# Patient Record
Sex: FEMALE | URBAN - METROPOLITAN AREA
[De-identification: names, ages, dates, MRNs, and addresses within clinical notes are randomized per-mention and may not be internally consistent; named-entity substitution may affect disease eponyms.]

---

## 2018-04-11 ENCOUNTER — OUTPATIENT (OUTPATIENT)
Dept: OUTPATIENT SERVICES | Facility: HOSPITAL | Age: 53
LOS: 1 days | End: 2018-04-11
Payer: COMMERCIAL

## 2018-04-11 VITALS
RESPIRATION RATE: 16 BRPM | TEMPERATURE: 98 F | SYSTOLIC BLOOD PRESSURE: 132 MMHG | HEART RATE: 94 BPM | HEIGHT: 63 IN | DIASTOLIC BLOOD PRESSURE: 85 MMHG | WEIGHT: 125.66 LBS

## 2018-04-11 DIAGNOSIS — Z29.9 ENCOUNTER FOR PROPHYLACTIC MEASURES, UNSPECIFIED: ICD-10-CM

## 2018-04-11 DIAGNOSIS — J38.1 POLYP OF VOCAL CORD AND LARYNX: ICD-10-CM

## 2018-04-11 DIAGNOSIS — Z98.891 HISTORY OF UTERINE SCAR FROM PREVIOUS SURGERY: Chronic | ICD-10-CM

## 2018-04-11 DIAGNOSIS — Z01.818 ENCOUNTER FOR OTHER PREPROCEDURAL EXAMINATION: ICD-10-CM

## 2018-04-11 LAB
APTT BLD: 29.2 SEC — SIGNIFICANT CHANGE UP (ref 27.5–37.4)
BASOPHILS # BLD AUTO: 0 K/UL — SIGNIFICANT CHANGE UP (ref 0–0.2)
BASOPHILS NFR BLD AUTO: 0.3 % — SIGNIFICANT CHANGE UP (ref 0–2)
EOSINOPHIL # BLD AUTO: 0.2 K/UL — SIGNIFICANT CHANGE UP (ref 0–0.5)
EOSINOPHIL NFR BLD AUTO: 2.6 % — SIGNIFICANT CHANGE UP (ref 0–6)
HCT VFR BLD CALC: 34.1 % — LOW (ref 37–47)
HGB BLD-MCNC: 11.3 G/DL — LOW (ref 12–16)
INR BLD: 0.95 RATIO — SIGNIFICANT CHANGE UP (ref 0.88–1.16)
LYMPHOCYTES # BLD AUTO: 2.5 K/UL — SIGNIFICANT CHANGE UP (ref 1–4.8)
LYMPHOCYTES # BLD AUTO: 34.1 % — SIGNIFICANT CHANGE UP (ref 20–55)
MCHC RBC-ENTMCNC: 30.9 PG — SIGNIFICANT CHANGE UP (ref 27–31)
MCHC RBC-ENTMCNC: 33.1 G/DL — SIGNIFICANT CHANGE UP (ref 32–36)
MCV RBC AUTO: 93.2 FL — SIGNIFICANT CHANGE UP (ref 81–99)
MONOCYTES # BLD AUTO: 0.5 K/UL — SIGNIFICANT CHANGE UP (ref 0–0.8)
MONOCYTES NFR BLD AUTO: 6.9 % — SIGNIFICANT CHANGE UP (ref 3–10)
NEUTROPHILS # BLD AUTO: 4.2 K/UL — SIGNIFICANT CHANGE UP (ref 1.8–8)
NEUTROPHILS NFR BLD AUTO: 56 % — SIGNIFICANT CHANGE UP (ref 37–73)
PLATELET # BLD AUTO: 306 K/UL — SIGNIFICANT CHANGE UP (ref 150–400)
PROTHROM AB SERPL-ACNC: 10.4 SEC — SIGNIFICANT CHANGE UP (ref 9.8–12.7)
RBC # BLD: 3.66 M/UL — LOW (ref 4.4–5.2)
RBC # FLD: 13.1 % — SIGNIFICANT CHANGE UP (ref 11–15.6)
WBC # BLD: 7.4 K/UL — SIGNIFICANT CHANGE UP (ref 4.8–10.8)
WBC # FLD AUTO: 7.4 K/UL — SIGNIFICANT CHANGE UP (ref 4.8–10.8)

## 2018-04-11 PROCEDURE — 85730 THROMBOPLASTIN TIME PARTIAL: CPT

## 2018-04-11 PROCEDURE — 36415 COLL VENOUS BLD VENIPUNCTURE: CPT

## 2018-04-11 PROCEDURE — 85027 COMPLETE CBC AUTOMATED: CPT

## 2018-04-11 PROCEDURE — 93010 ELECTROCARDIOGRAM REPORT: CPT

## 2018-04-11 PROCEDURE — 85610 PROTHROMBIN TIME: CPT

## 2018-04-11 PROCEDURE — 93005 ELECTROCARDIOGRAM TRACING: CPT

## 2018-04-11 PROCEDURE — G0463: CPT

## 2018-04-11 NOTE — H&P PST ADULT - ASSESSMENT
52 year old female present with complaints of voice changes.  Patient state work up reveled vocal cord polyps.  She is schedule for direct suspension laryngoscopy CO2 laser excision vocal cord polyps.    CAPRINI SCORE [CLOT]    AGE RELATED RISK FACTORS                                                       MOBILITY RELATED FACTORS  [ x] Age 41-60 years                                            (1 Point)                  [ ] Bed rest                                                        (1 Point)  [ ] Age: 61-74 years                                           (2 Points)                 [ ] Plaster cast                                                   (2 Points)  [ ] Age= 75 years                                              (3 Points)                 [ ] Bed bound for more than 72 hours                 (2 Points)    DISEASE RELATED RISK FACTORS                                               GENDER SPECIFIC FACTORS  [ ] Edema in the lower extremities                       (1 Point)                  [ ] Pregnancy                                                     (1 Point)  [ ] Varicose veins                                               (1 Point)                  [ ] Post-partum < 6 weeks                                   (1 Point)             [ x] BMI > 25 Kg/m2                                            (1 Point)                  [ ] Hormonal therapy  or oral contraception          (1 Point)                 [ ] Sepsis (in the previous month)                        (1 Point)                  [ ] History of pregnancy complications                 (1 point)  [ ] Pneumonia or serious lung disease                                               [ ] Unexplained or recurrent                     (1 Point)           (in the previous month)                               (1 Point)  [ ] Abnormal pulmonary function test                     (1 Point)                 SURGERY RELATED RISK FACTORS  [ ] Acute myocardial infarction                              (1 Point)                 [ ]  Section                                             (1 Point)  [ ] Congestive heart failure (in the previous month)  (1 Point)               [ ] Minor surgery                                                  (1 Point)   [ ] Inflammatory bowel disease                             (1 Point)                 [ ] Arthroscopic surgery                                        (2 Points)  [ ] Central venous access                                      (2 Points)                [ xGeneral surgery lasting more than 45 minutes   (2 Points)       [ ] Stroke (in the previous month)                          (5 Points)               [ ] Elective arthroplasty                                         (5 Points)                                                                                                                                               HEMATOLOGY RELATED FACTORS                                                 TRAUMA RELATED RISK FACTORS  [ ] Prior episodes of VTE                                     (3 Points)                 [ ] Fracture of the hip, pelvis, or leg                       (5 Points)  [ ] Positive family history for VTE                         (3 Points)                 [ ] Acute spinal cord injury (in the previous month)  (5 Points)  [ ] Prothrombin 76305 A                                     (3 Points)                 [ ] Paralysis  (less than 1 month)                             (5 Points)  [ ] Factor V Leiden                                             (3 Points)                  [ ] Multiple Trauma within 1 month                        (5 Points)  [ ] Lupus anticoagulants                                     (3 Points)                                                           [ ] Anticardiolipin antibodies                               (3 Points)                                                       [ ] High homocysteine in the blood                      (3 Points)                                             [ ] Other congenital or acquired thrombophilia      (3 Points)                                                [ ] Heparin induced thrombocytopenia                  (3 Points)                                          Total Score [    4    ]

## 2018-04-11 NOTE — H&P PST ADULT - HISTORY OF PRESENT ILLNESS
52 year old female present with complaints of voice changes.  Patient state work up reveled vocal cord polyps.  She is schedule for direct suspension laryngoscopy CO2 laser excision vocal cord polyps 52 year old female present with complaints of voice changes.  Patient state work up reveled vocal cord polyps.  She is schedule for direct suspension laryngoscopy CO2 laser excision vocal cord polyps.

## 2018-04-11 NOTE — H&P PST ADULT - NSANTHOSAYNRD_GEN_A_CORE
No. KELSEY screening performed.  STOP BANG Legend: 0-2 = LOW Risk; 3-4 = INTERMEDIATE Risk; 5-8 = HIGH Risk

## 2018-04-20 ENCOUNTER — OUTPATIENT (OUTPATIENT)
Dept: OUTPATIENT SERVICES | Facility: HOSPITAL | Age: 53
LOS: 1 days | End: 2018-04-20
Payer: COMMERCIAL

## 2018-04-20 VITALS
WEIGHT: 128.09 LBS | HEIGHT: 64 IN | DIASTOLIC BLOOD PRESSURE: 65 MMHG | TEMPERATURE: 98 F | RESPIRATION RATE: 16 BRPM | HEART RATE: 82 BPM | OXYGEN SATURATION: 99 % | SYSTOLIC BLOOD PRESSURE: 113 MMHG

## 2018-04-20 VITALS
RESPIRATION RATE: 14 BRPM | HEART RATE: 69 BPM | SYSTOLIC BLOOD PRESSURE: 134 MMHG | DIASTOLIC BLOOD PRESSURE: 76 MMHG | OXYGEN SATURATION: 100 %

## 2018-04-20 DIAGNOSIS — Z98.891 HISTORY OF UTERINE SCAR FROM PREVIOUS SURGERY: Chronic | ICD-10-CM

## 2018-04-20 DIAGNOSIS — Z01.818 ENCOUNTER FOR OTHER PREPROCEDURAL EXAMINATION: ICD-10-CM

## 2018-04-20 DIAGNOSIS — J38.1 POLYP OF VOCAL CORD AND LARYNX: ICD-10-CM

## 2018-04-20 PROCEDURE — 88305 TISSUE EXAM BY PATHOLOGIST: CPT

## 2018-04-20 PROCEDURE — 31536 LARYNGOSCOPY W/BX & OP SCOPE: CPT

## 2018-04-20 PROCEDURE — 88305 TISSUE EXAM BY PATHOLOGIST: CPT | Mod: 26

## 2018-04-20 RX ORDER — ONDANSETRON 8 MG/1
4 TABLET, FILM COATED ORAL ONCE
Qty: 0 | Refills: 0 | Status: DISCONTINUED | OUTPATIENT
Start: 2018-04-20 | End: 2018-04-20

## 2018-04-20 RX ORDER — FENTANYL CITRATE 50 UG/ML
25 INJECTION INTRAVENOUS
Qty: 0 | Refills: 0 | Status: DISCONTINUED | OUTPATIENT
Start: 2018-04-20 | End: 2018-04-20

## 2018-04-20 RX ORDER — SODIUM CHLORIDE 9 MG/ML
3 INJECTION INTRAMUSCULAR; INTRAVENOUS; SUBCUTANEOUS ONCE
Qty: 0 | Refills: 0 | Status: DISCONTINUED | OUTPATIENT
Start: 2018-04-20 | End: 2018-04-20

## 2018-04-20 RX ORDER — SODIUM CHLORIDE 9 MG/ML
1000 INJECTION, SOLUTION INTRAVENOUS
Qty: 0 | Refills: 0 | Status: DISCONTINUED | OUTPATIENT
Start: 2018-04-20 | End: 2018-04-20

## 2018-04-20 RX ADMIN — FENTANYL CITRATE 25 MICROGRAM(S): 50 INJECTION INTRAVENOUS at 12:52

## 2018-04-20 RX ADMIN — FENTANYL CITRATE 25 MICROGRAM(S): 50 INJECTION INTRAVENOUS at 12:57

## 2018-04-20 NOTE — BRIEF OPERATIVE NOTE - PROCEDURE
<<-----Click on this checkbox to enter Procedure Removal of vocal cord polyps  04/20/2018  Direct suspension microlaryngoscopy  with CO2 laser  Active  RGLUIS E

## 2018-04-20 NOTE — BRIEF OPERATIVE NOTE - OPERATION/FINDINGS
huge polyps  left > right  second polyp anterior under vocal cord on the right    Ball valving polyps

## 2018-04-20 NOTE — ASU DISCHARGE PLAN (ADULT/PEDIATRIC). - MEDICATION SUMMARY - MEDICATIONS TO TAKE
I will START or STAY ON the medications listed below when I get home from the hospital:    Percocet 5/325  -- 1 to 2 tab(s) by mouth every 4 hours (5 times/day), As Needed for pain  -- Indication: For Pain med    Cefzil 250 mg oral tablet  -- 1 tab(s) by mouth every 12 hours x 10 days  -- Indication: For antibiotic    omeprazole 40 mg oral delayed release capsule  -- 1 cap(s) by mouth once a day  -- Indication: For antacid med

## 2018-04-20 NOTE — BRIEF OPERATIVE NOTE - POST-OP DX
Vocal cord polyps  04/20/2018    Active  Silvino Cristobal Vocal cord polyps  04/20/2018  Bilateral  Active  Silvino Cristobal

## 2018-04-24 LAB — SURGICAL PATHOLOGY FINAL REPORT - CH: SIGNIFICANT CHANGE UP

## 2019-10-02 PROBLEM — K21.9 GASTRO-ESOPHAGEAL REFLUX DISEASE WITHOUT ESOPHAGITIS: Chronic | Status: ACTIVE | Noted: 2018-04-11

## 2019-10-03 ENCOUNTER — APPOINTMENT (OUTPATIENT)
Dept: SURGICAL ONCOLOGY | Facility: CLINIC | Age: 54
End: 2019-10-03

## 2019-10-03 PROBLEM — Z00.00 ENCOUNTER FOR PREVENTIVE HEALTH EXAMINATION: Status: ACTIVE | Noted: 2019-10-03

## 2021-12-08 NOTE — ASU DISCHARGE PLAN (ADULT/PEDIATRIC). - NOTIFY
[facility] History & Physical    Patient: Bessie Granados   MRN: [id number]  : 1941       Cc: Unwitnessed fall    HPI: This is a 80 year old year old female, hospice patient of Jt Parker DO with past medical history of COPD, dysarthria hypertension severe, non-Hodgkin's lymphoma, dementia who presented to the ED after having an unwitnessed fall at the nursing home this morning. The patient was seen and examined at the bedside.  She currently lives at [facility] under [facility].  History obtained from her son, she apparently fell sometime during the night she is we will chair bound.  She is been in hospice care the past 6 to 9 months for her severe dementia and COPD. She sustained a small left scalp laceration and bruising to her left orbital area, bruising additionally noted to her right knee and hip.  She was evaluated by cardiology. Her scalp laceration was repaired in the ED. I spoke with her son and he is requesting she is transferred back to [facility] under hospice.  [facility] has accepted her back.    Review of Systems  Unable to perform due to dementia     Past Medical History  Past Medical History:   Diagnosis Date   • COPD (chronic obstructive pulmonary disease) (CMS/[facility])    • Coronary artery disease    • Essential (primary) hypertension    • High cholesterol    • Myocardial infarction (CMS/[facility])    • Osteoporosis    • Uncomplicated senile dementia (CMS/[facility])         Surgical History  History reviewed. No pertinent surgical history.     Social History  Social History     Tobacco Use   • Smoking status: Former Smoker   • Smokeless tobacco: Never Used   Substance Use Topics   • Alcohol use: Not Currently   • Drug use: Never       Family History  History reviewed. No pertinent family history.     Allergies  ALLERGIES:  Penicillin g    Medications  Medications Prior to Admission   Medication Sig Dispense Refill   • bisacodyl (DULCOLAX) 10 MG suppository Place 10 mg rectally daily as needed for  Constipation. Indications: Constipation     • Skin Protectants, Misc. (eucerin) cream Apply 1 application topically at bedtime as needed for Dry Skin. Apply to full body     • hyoscyamine (LEVSIN SL) 0.125 MG sublingual tablet Place 0.125 mg under the tongue every 6 hours as needed (secretions).     • senna (SENOKOT) 8.6 MG tablet Take 2 tablets by mouth daily. Indications: Constipation     • omeprazole (PrilOSEC) 20 MG capsule Take 20 mg by mouth daily.     • ipratropium-albuterol (DUONEB) 0.5-2.5 (3) MG/3ML nebulizer solution Take 3 mLs by nebulization every 6 hours as needed. Do not start before September 13, 2020.     • docusate sodium (COLACE) 100 MG capsule Take 100 mg by mouth 2 times daily as needed for Constipation.     • guaifenesin 100 MG/5ML Take 300 mg by mouth every 6 hours as needed (cough).     • umeclidinium bromide (Incruse Ellipta) 62.5 MCG/INH inhaler Inhale 1 puff into the lungs daily.     • latanoprost (XALATAN) 0.005 % ophthalmic solution Place 1 drop into both eyes nightly.     • metoPROLOL succinate (TOPROL-XL) 50 MG 24 hr tablet Take 50 mg by mouth daily. Indications: High Blood Pressure Disorder Hold if SBP is less than 120 or HR less than 60     • acetaminophen (TYLENOL) 325 MG tablet Take 650 mg by mouth every 6 hours as needed for Pain.         Current medications  Current Facility-Administered Medications   Medication Dose Route Frequency Provider Last Rate Last Admin   • lidocaine-EPINEPHrine 1 %-1:[id number] injection 5 mL  5 mL Injection Once Marisela Bell PA-C           Physical Exam  Physical Exam  Vitals and nursing note reviewed.   Constitutional:       General: She is awake.      Appearance: She is cachectic. She is ill-appearing. She is not toxic-appearing or diaphoretic.   Eyes:      Extraocular Movements: Extraocular movements intact.      Pupils: Pupils are equal, round, and reactive to light.   Cardiovascular:      Rate and Rhythm: Normal rate and regular rhythm.      Pulses:  Normal pulses.      Heart sounds: Normal heart sounds, S1 normal and S2 normal. No murmur heard.      Pulmonary:      Effort: Pulmonary effort is normal.      Breath sounds: Normal breath sounds.   Abdominal:      General: Bowel sounds are normal.   Musculoskeletal:      Right lower leg: No edema.      Left lower leg: No edema.      Comments: Bruising noted to her left orbit right knee right hip   Skin:     General: Skin is warm and dry.   Neurological:      Mental Status: Mental status is at baseline.              OBJECTIVE:      VITAL SIGNS:    Vital Last Value 24 Hour Range   Temperature 97.3 °F (36.3 °C) (12/08/21 1413) Temp  Min: 97.3 °F (36.3 °C)  Max: 97.7 °F (36.5 °C)   Pulse 75 (12/08/21 1413) Pulse  Min: 75  Max: 102   Respiratory 16 (12/08/21 1413) Resp  Min: 16  Max: 19   Non-Invasive  Blood Pressure 128/72 (12/08/21 1413) BP  Min: 128/72  Max: 176/72   Pulse Oximetry 95 % (12/08/21 1413) SpO2  Min: 93 %  Max: 97 %     Vital Today Admitted   Weight 53.7 kg (118 lb 6.2 oz) (12/08/21 0609) Weight: 53.7 kg (118 lb 6.2 oz) (12/08/21 0609)   Height N/A Height: 5' (152.4 cm) (12/08/21 0609)   Body Mass Index N/A BMI (Calculated): 23.12 (12/08/21 0609)     LABS:   Recent Results (from the past 24 hour(s))   Comprehensive Metabolic Panel    Collection Time: 12/08/21  6:15 AM   Result Value Ref Range    Fasting Status      Sodium 139 135 - 145 mmol/L    Potassium 3.9 3.4 - 5.1 mmol/L    Chloride 109 (H) 98 - 107 mmol/L    Carbon Dioxide 27 21 - 32 mmol/L    Anion Gap 7 (L) 10 - 20 mmol/L    Glucose 79 70 - 99 mg/dL    BUN 27 (H) 6 - 20 mg/dL    Creatinine 0.97 (H) 0.51 - 0.95 mg/dL    Glomerular Filtration Rate 55 (L) >=60    BUN/ Creatinine Ratio 28 (H) 7 - 25    Calcium 9.7 8.4 - 10.2 mg/dL    Bilirubin, Total 0.3 0.2 - 1.0 mg/dL    GOT/AST 20 <=37 Units/L    GPT/ALT 17 <64 Units/L    Alkaline Phosphatase 74 45 - 117 Units/L    Albumin 3.4 (L) 3.6 - 5.1 g/dL    Protein, Total 7.6 6.4 - 8.2 g/dL    Globulin 4.2  (H) 2.0 - 4.0 g/dL    A/G Ratio 0.8 (L) 1.0 - 2.4   TROPONIN I, HIGH SENSITIVITY    Collection Time: 12/08/21  6:15 AM   Result Value Ref Range    Troponin I, High Sensitivity 49 <52 ng/L   Creatine Kinase    Collection Time: 12/08/21  6:15 AM   Result Value Ref Range     26 - 192 Units/L   CBC with Automated Differential (performable only)    Collection Time: 12/08/21  6:15 AM   Result Value Ref Range    WBC 10.4 4.2 - 11.0 K/mcL    RBC 4.41 4.00 - 5.20 mil/mcL    HGB 13.7 12.0 - 15.5 g/dL    HCT 42.9 36.0 - 46.5 %    MCV 97.3 78.0 - 100.0 fl    MCH 31.1 26.0 - 34.0 pg    MCHC 31.9 (L) 32.0 - 36.5 g/dL    RDW-CV 13.6 11.0 - 15.0 %    RDW-SD 49.3 39.0 - 50.0 fL     140 - 450 K/mcL    NRBC 0 <=0 /100 WBC    Neutrophil, Percent 80 %    Lymphocytes, Percent 10 %    Mono, Percent 7 %    Eosinophils, Percent 1 %    Basophils, Percent 1 %    Immature Granulocytes 1 %    Absolute Neutrophils 8.5 (H) 1.8 - 7.7 K/mcL    Absolute Lymphocytes 1.0 1.0 - 4.0 K/mcL    Absolute Monocytes 0.8 0.3 - 0.9 K/mcL    Absolute Eosinophils  0.1 0.0 - 0.5 K/mcL    Absolute Basophils 0.1 0.0 - 0.3 K/mcL    Absolute Immmature Granulocytes 0.1 0.0 - 0.2 K/mcL   Electrocardiogram 12-Lead    Collection Time: 12/08/21  7:01 AM   Result Value Ref Range    Ventricular Rate EKG/Min (BPM) 98     Atrial Rate (BPM) 98     IA-Interval (MSEC) 162     QRS-Interval (MSEC) 60     QT-Interval (MSEC) 338     QTc 431     P Axis (Degrees) 89     R Axis (Degrees) -73     T Axis (Degrees) 57     REPORT TEXT       Normal sinus rhythm  Left axis deviation  Pulmonary disease pattern  Inferior infarct  (cited on or before  01-SEP-2020)  Abnormal ECG  When compared with ECG of  01-SEP-2020 11:41,  QRS axis  shifted left  T wave amplitude has decreased in  Lateral leads     TROPONIN I, HIGH SENSITIVITY    Collection Time: 12/08/21  8:59 AM   Result Value Ref Range    Troponin I, High Sensitivity 80 (HH) <52 ng/L   Rapid SARS-CoV-2 by PCR    Collection  Time: 12/08/21 10:29 AM    Specimen: Nasal, Mid-turbinate; Swab   Result Value Ref Range    Rapid SARS-COV-2 by PCR Not Detected Not Detected / Detected / Presumptive Positive / Inhibitors present    Isolation Guidelines      Procedural Comment          IMAGING:  LAST CT:  === 12/08/21 ===    CT CERVICAL SPINE WO CONTRAST    - Narrative -  EXAM: CT CERVICAL SPINE WO CONTRAST    CLINICAL INDICATION: Neck pain after fall.    COMPARISON: 09/01/2020.    TECHNIQUE: The study was acquired in a helical fashion with multiplanar  thin-section reconstructions.    A combination of at least one of these dose reduction techniques were used.  Automated exposure control for dose reduction, adjustment of the mA and/or  kV according to patient size, or use of iterative reconstruction technique  for dose reduction.    FINDINGS:  CT of the cervical spine demonstrates no evidence of fracture, disc  disruption or facet dislocation. The prevertebral soft tissues appear  unremarkable. There is levoscoliosis of the cervical spine.  Reversal the  normal cervical lordosis centered at C5-C6.  Moderate multilevel  degenerative changes with loss of disc space height, disc osteophyte  complexes and facet arthropathy.  Degenerative changes most pronounced at  C3-C4 and C6-C7.  There is associated central canal and neural foraminal  narrowing at these levels.  Degenerative changes bilateral  temporomandibular joints.  Advanced emphysematous changes are seen at the  lung apices.    - Impression -  Multilevel degenerative changes without evidence of acute cervical spine  fracture or subluxation.    Electronically Signed by: SELENA MATTHEW M.D.  Signed on: 12/8/2021 8:35 AM    ___________________________________________________________________________    CT HEAD WO CONTRAST    - Narrative -  EXAM: CT HEAD WO CONTRAST    CLINICAL INDICATION: Headache after fall.  Scalp hematoma.    COMPARISON: 09/01/2020.    TECHNIQUE: Multiple axial images of the head  were obtained from the base of  the skull to the vertex using standard protocol.    A combination of at least one of these dose reduction techniques were used.  Automated exposure control for dose reduction, adjustment of the mA and/or  kV according to patient size, or use of iterative reconstruction technique  for dose reduction.    FINDINGS:  There is no evidence of mass, parenchymal hemorrhage or extra-axial fluid  collection.  Moderate cerebral volume loss.  No evidence of hydrocephalus.  No evidence of edema or midline shift.  The gray-white matter  differentiation is preserved.  Mild, nonspecific, patchy hypodensities in  the periventricular and subcortical white matter likely microvascular  ischemic changes, age indeterminant.  Focal hypodensity in the left  thalamus suggesting prior lacunar infarction.    The craniocervical junction is unremarkable. There is no evidence of  depressed skull fracture or aggressive bony lesion. The orbital structures  appear symmetric and unremarkable. The paranasal sinuses and mastoid air  cells are clear. Large frontal scalp hematoma.    - Impression -  No evidence of acute intracranial process.  No significant interval change.    Electronically Signed by: SELENA MATTHEW M.D.  Signed on: 12/8/2021 8:30 AM    LAST X-RAY:  === 12/08/21 ===    XR PELVIS 1 VIEW    - Narrative -  EXAM: XR PELVIS 1 VIEW    CLINICAL INDICATION: Pelvic pain after unwitnessed fall.    COMPARISON: 07/11/2019    FINDINGS:  Frontal view of the pelvis was obtained.    Pelvic ring is intact.  SI joints are symmetric.  Sacral arches are  preserved.  Degenerative changes of the lumbar spine.    Femoral heads are well-formed and deep seated in the acetabula.  Evaluation  of the right femoral neck is slightly limited due to patient rotation.  If  concern for right hip fracture dedicated right hip views could be obtained.  Grossly no evidence of acute fracture or dislocation.  Degenerative  changes of bilateral  hips.  Vascular calcifications.  Stable oval density  projecting over the left lesser trochanter.    - Impression -  Grossly no evidence of acute fracture or dislocation.    Electronically Signed by: SELENA MATTHEW M.D.  Signed on: 12/8/2021 7:13 AM    ___________________________________________________________________________    XR CHEST PA OR AP 1 VIEW    - Narrative -  EXAM: XR CHEST PA OR AP 1 VIEW    CLINICAL INDICATION: Chest pain.  Fall.    COMPARISON: 09/01/2020    FINDINGS:  Frontal view of the chest was obtained.    Hyperinflation of the lungs with flattening of the diaphragms and  coarsening of interstitial lung markings suggesting COPD changes.  No  evidence of focal consolidation, pleural effusion or pneumothorax.    The cardiomediastinal silhouette is normal in size. Pulmonary vascularity  is within normal limits.  Calcified right hilar lymph nodes and calcified  granuloma right lower lobe.  Healed right lower rib fractures.  Dextroscoliosis with degenerative changes of the thoracic spine.    - Impression -  Advanced COPD changes without evidence of focal consolidation, pleural  effusion or pneumothorax.    Electronically Signed by: SELENA MATTHEW M.D.  Signed on: 12/8/2021 7:09 AM      === 09/01/20 ===      ASSESSMENT/PLAN:     Unwitnessed fall s/p scalp laceration  Wound repair in ED  At baseline  CT head, cervical spine completed noted as above   Evaluated by Cardiology  Patient is under hospice care ([facility])  Son wishes her transferred back there today  Patient is full DNR      PCP: Jt Parker,     Disposition:  Tx back to [facility] with Hospice ([facility])    Discussed with attending physician Dr Maximo Parker  All patient studies and labs have been reviewed.  All patient's questions and concerns were addressed.  D/w nurse  A face to face visit took place today    [facility]  [place]  [phone]  Call for questions.   Persistent Nausea and Vomiting/Fever greater than 101

## 2022-06-29 ENCOUNTER — APPOINTMENT (OUTPATIENT)
Dept: ORTHOPEDIC SURGERY | Facility: CLINIC | Age: 57
End: 2022-06-29

## 2022-07-20 ENCOUNTER — APPOINTMENT (OUTPATIENT)
Dept: ORTHOPEDIC SURGERY | Facility: CLINIC | Age: 57
End: 2022-07-20

## 2022-07-20 VITALS
HEIGHT: 63.5 IN | SYSTOLIC BLOOD PRESSURE: 106 MMHG | DIASTOLIC BLOOD PRESSURE: 64 MMHG | WEIGHT: 107 LBS | BODY MASS INDEX: 18.72 KG/M2 | HEART RATE: 99 BPM

## 2022-07-20 DIAGNOSIS — M21.162 VARUS DEFORMITY, NOT ELSEWHERE CLASSIFIED, LEFT KNEE: ICD-10-CM

## 2022-07-20 DIAGNOSIS — M17.12 UNILATERAL PRIMARY OSTEOARTHRITIS, LEFT KNEE: ICD-10-CM

## 2022-07-20 PROCEDURE — 99204 OFFICE O/P NEW MOD 45 MIN: CPT

## 2022-07-20 PROCEDURE — 73562 X-RAY EXAM OF KNEE 3: CPT | Mod: LT

## 2022-07-20 NOTE — DISCUSSION/SUMMARY
[Medication Risks Reviewed] : Medication risks reviewed [Surgical risks reviewed] : Surgical risks reviewed [de-identified] : 58 y/o F with bone on bone medial compartmental osteoarthritis with significant medial tibial bone loss of the left knee. We discussed the nature of the condition and the treatment options. Based on imaging, she is a candidate for a left TKA. She is having worsening pain and is failing conservative therapies. She would like to pursue surgery and she started the scheduling process today. She has left LE edema and encourage her to try to control her swelling before surgery as it increases her risk for infection. We gave her Celebrex which she should take only as needed. All questions were answered. \par \par The patient is a 57 year individual with end stage arthritis of their left knee joint. Based upon the patient's continued symptoms and failure to respond to conservative treatment I have recommended a left total knee arthroplasty for this patient. A long discussion took place with the patient describing what a total joint replacement is and what the procedure would entail. A total knee arthroplasty model, similar to the implant that was used during the operation, was utilized to demonstrate and to discuss the various bearing surfaces of the implants. The hospitalization and post-operative care and rehabilitation were also discussed. The use of perioperative antibiotics and DVT prophylaxis were discussed. The risk, benefits and alternatives to a surgical intervention were discussed at length with the patient. The patient was also advised of risks related to the medical comorbidities, elevated body mass index (BMI), and smoking where applicable. We discussed how to reduce modifiable risk factors and encouraged smoking cessation were applicable. A lengthy discussion took place to review the most common complications including but not limited to: deep vein thrombosis, pulmonary embolus, heart attack, stroke, infection, wound breakdown, numbness, damage to nerves, tendon, muscles, arteries or other blood vessels, death and other possible complications from anesthesia. The patient was told that we will take steps to minimize these risks by using sterile technique, antibiotics and DVT prophylaxis when appropriate and follow the patient postoperatively in the office setting to monitor progress. The possibility of recurrent pain, no improvement in pain and actual worsening of pain were also discussed with the patient.\par The discharge plan of care focused on the patient going home following surgery. The patient was encouraged to make the necessary arrangements to have someone stay with them when they are discharged home. Following discharge, a home care nurse was to the patient. The home care nurse would open the patient’s home care case and request home physical therapy services. Home physical therapy was to commence following discharge provided it was appropriate and covered by the health insurance benefit plan. \par The benefits of surgery were discussed with the patient including the potential for improving her current clinical condition through operative intervention. Alternatives to surgical intervention including continued conservative management were also discussed in detail. All questions were answered to the satisfaction of the patient. The treatment plan of care, as well as a model of a total knee arthroplasty equivalent to the one that will be used for their total joint replacement, was shared with the patient. The patient agreed to the plan of care as well as the use of implants in their total joint replacement.

## 2022-07-20 NOTE — HISTORY OF PRESENT ILLNESS
[Pain Location] : pain [4] : a minimum pain level of 4/10 [10] : a maximum pain level of 10/10 [Bending] : worsened by bending [Walking] : worsened by walking [Ice] : relieved by ice [Rest] : relieved by rest [de-identified] : 58 y/o F presents with left knee pain. She recently had an aspiration and they removed 72 cc's. She also has had a couple of cortisone injections and states that the last cortisone injection was not too helpful. She went to an urgent care and was told that she had a fracture. She states that she is unable to fully straighten her leg. She reports swelling and was recently on a diuretic. She is on no blood thinners. She is taking no antiinflammatories. 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 She is unsure what she had and the type of surgery she had.

## 2022-07-20 NOTE — PHYSICAL EXAM
[LE] : Sensory: Intact in bilateral lower extremities [ALL] : dorsalis pedis, posterior tibial, femoral, popliteal, and radial 2+ and symmetric bilaterally [Normal] : Alert and in no acute distress [Poor Appearance] : well-appearing [de-identified] : GENERAL APPEARANCE: Well nourished and hydrated, pleasant, alert, and oriented x 3. Appears their stated age. \par HEENT: Normocephalic, extraocular eye motion intact. Nasal septum midline. Oral cavity clear. External auditory canal clear. \par RESPIRATORY: Breath sounds clear and audible in all lobes. No wheezing, No accessory muscle use.\par CARDIOVASCULAR: No apparent abnormalities. No lower leg edema. No varicosities. Pedal pulses are palpable.\par NEUROLOGIC: Sensation is normal, no muscle weakness in the upper or lower extremities.\par DERMATOLOGIC: No apparent skin lesions, moist, warm, no rash.\par SPINE: Cervical spine appears normal and moves freely; thoracic spine appears normal and moves freely; lumbosacral spine appears normal and moves freely, normal, nontender.\par MUSCULOSKELETAL: Hands, wrists, and elbows are normal and move freely, shoulders are normal and move freely.  [de-identified] : Left knee exam shows ROM 5-110 degrees, medial joint line tenderness, effusion, varus deformity\par +1-2 left LE edema [de-identified] : 3V xray of the left knee done in the office today and reviewed by Dr. Suleman Berry demonstrates bone on bone medial compartmental osteoarthritis with significant medial tibial bone loss

## 2022-07-20 NOTE — END OF VISIT
[FreeTextEntry3] : I, Uche Hernández, acted solely as a scribe for Dr. Suleman Berry on this date 07/20/2022.

## 2022-07-22 ENCOUNTER — OUTPATIENT (OUTPATIENT)
Dept: OUTPATIENT SERVICES | Facility: HOSPITAL | Age: 57
LOS: 1 days | End: 2022-07-22
Payer: COMMERCIAL

## 2022-07-22 VITALS
HEIGHT: 64 IN | RESPIRATION RATE: 16 BRPM | OXYGEN SATURATION: 97 % | WEIGHT: 106.26 LBS | SYSTOLIC BLOOD PRESSURE: 96 MMHG | DIASTOLIC BLOOD PRESSURE: 53 MMHG | TEMPERATURE: 97 F | HEART RATE: 82 BPM

## 2022-07-22 DIAGNOSIS — K21.9 GASTRO-ESOPHAGEAL REFLUX DISEASE WITHOUT ESOPHAGITIS: ICD-10-CM

## 2022-07-22 DIAGNOSIS — Z01.818 ENCOUNTER FOR OTHER PREPROCEDURAL EXAMINATION: ICD-10-CM

## 2022-07-22 DIAGNOSIS — Z29.9 ENCOUNTER FOR PROPHYLACTIC MEASURES, UNSPECIFIED: ICD-10-CM

## 2022-07-22 DIAGNOSIS — M17.12 UNILATERAL PRIMARY OSTEOARTHRITIS, LEFT KNEE: ICD-10-CM

## 2022-07-22 DIAGNOSIS — B00.9 HERPESVIRAL INFECTION, UNSPECIFIED: ICD-10-CM

## 2022-07-22 DIAGNOSIS — Z90.3 ACQUIRED ABSENCE OF STOMACH [PART OF]: Chronic | ICD-10-CM

## 2022-07-22 DIAGNOSIS — Z98.891 HISTORY OF UTERINE SCAR FROM PREVIOUS SURGERY: Chronic | ICD-10-CM

## 2022-07-22 DIAGNOSIS — Z22.322 CARRIER OR SUSPECTED CARRIER OF METHICILLIN RESISTANT STAPHYLOCOCCUS AUREUS: ICD-10-CM

## 2022-07-22 DIAGNOSIS — F32.9 MAJOR DEPRESSIVE DISORDER, SINGLE EPISODE, UNSPECIFIED: ICD-10-CM

## 2022-07-22 LAB
A1C WITH ESTIMATED AVERAGE GLUCOSE RESULT: 5.8 % — HIGH (ref 4–5.6)
ANION GAP SERPL CALC-SCNC: 8 MMOL/L — SIGNIFICANT CHANGE UP (ref 5–17)
APTT BLD: 30.4 SEC — SIGNIFICANT CHANGE UP (ref 27.5–35.5)
BLD GP AB SCN SERPL QL: SIGNIFICANT CHANGE UP
BUN SERPL-MCNC: 12.7 MG/DL — SIGNIFICANT CHANGE UP (ref 8–20)
CALCIUM SERPL-MCNC: 9.1 MG/DL — SIGNIFICANT CHANGE UP (ref 8.6–10.2)
CHLORIDE SERPL-SCNC: 99 MMOL/L — SIGNIFICANT CHANGE UP (ref 98–107)
CO2 SERPL-SCNC: 31 MMOL/L — HIGH (ref 22–29)
CREAT SERPL-MCNC: 0.55 MG/DL — SIGNIFICANT CHANGE UP (ref 0.5–1.3)
EGFR: 106 ML/MIN/1.73M2 — SIGNIFICANT CHANGE UP
ESTIMATED AVERAGE GLUCOSE: 120 MG/DL — HIGH (ref 68–114)
GLUCOSE SERPL-MCNC: 100 MG/DL — HIGH (ref 70–99)
HCT VFR BLD CALC: 31.2 % — LOW (ref 34.5–45)
HGB BLD-MCNC: 10.2 G/DL — LOW (ref 11.5–15.5)
INR BLD: 0.95 RATIO — SIGNIFICANT CHANGE UP (ref 0.88–1.16)
MCHC RBC-ENTMCNC: 31.8 PG — SIGNIFICANT CHANGE UP (ref 27–34)
MCHC RBC-ENTMCNC: 32.7 GM/DL — SIGNIFICANT CHANGE UP (ref 32–36)
MCV RBC AUTO: 97.2 FL — SIGNIFICANT CHANGE UP (ref 80–100)
MRSA PCR RESULT.: SIGNIFICANT CHANGE UP
PLATELET # BLD AUTO: 323 K/UL — SIGNIFICANT CHANGE UP (ref 150–400)
POTASSIUM SERPL-MCNC: 3.9 MMOL/L — SIGNIFICANT CHANGE UP (ref 3.5–5.3)
POTASSIUM SERPL-SCNC: 3.9 MMOL/L — SIGNIFICANT CHANGE UP (ref 3.5–5.3)
PROTHROM AB SERPL-ACNC: 11 SEC — SIGNIFICANT CHANGE UP (ref 10.5–13.4)
RBC # BLD: 3.21 M/UL — LOW (ref 3.8–5.2)
RBC # FLD: 12.8 % — SIGNIFICANT CHANGE UP (ref 10.3–14.5)
S AUREUS DNA NOSE QL NAA+PROBE: SIGNIFICANT CHANGE UP
SARS-COV-2 RNA SPEC QL NAA+PROBE: SIGNIFICANT CHANGE UP
SODIUM SERPL-SCNC: 138 MMOL/L — SIGNIFICANT CHANGE UP (ref 135–145)
WBC # BLD: 5.63 K/UL — SIGNIFICANT CHANGE UP (ref 3.8–10.5)
WBC # FLD AUTO: 5.63 K/UL — SIGNIFICANT CHANGE UP (ref 3.8–10.5)

## 2022-07-22 PROCEDURE — 93005 ELECTROCARDIOGRAM TRACING: CPT

## 2022-07-22 PROCEDURE — 93010 ELECTROCARDIOGRAM REPORT: CPT

## 2022-07-22 PROCEDURE — G0463: CPT

## 2022-07-22 RX ORDER — CEFPROZIL 500 MG/1
1 TABLET, FILM COATED ORAL
Qty: 0 | Refills: 0 | DISCHARGE

## 2022-07-22 RX ORDER — MUPIROCIN 20 MG/G
2 OINTMENT TOPICAL
Qty: 1 | Refills: 1 | Status: ACTIVE | COMMUNITY
Start: 2022-07-22 | End: 1900-01-01

## 2022-07-22 RX ORDER — CELECOXIB 200 MG/1
200 CAPSULE ORAL TWICE DAILY
Qty: 14 | Refills: 0 | Status: ACTIVE | COMMUNITY
Start: 2022-07-22 | End: 1900-01-01

## 2022-07-22 NOTE — H&P PST ADULT - PROBLEM SELECTOR PLAN 5
Left Knee Total Joint replacement By Dr. Berry on 7/26/22. Covid vaccine series completed, card in chart,(J&J)  covid test was done today. Medical Clearance pending

## 2022-07-22 NOTE — H&P PST ADULT - NSICDXPASTMEDICALHX_GEN_ALL_CORE_FT
PAST MEDICAL HISTORY:  GERD (gastroesophageal reflux disease)     Herpes simplex     Major depression

## 2022-07-22 NOTE — H&P PST ADULT - CARDIOVASCULAR
negative normal/regular rate and rhythm/S1 S2 present/no gallops/no rub/no murmur/pedal edema/vascular

## 2022-07-22 NOTE — H&P PST ADULT - ASSESSMENT
No abnormal movements 57 year old female with left knee pain for the last 10 months which got progressively got worse over the last few weeks, complains of 10/10 pain, with all activities, she is using a crutches for the last few days to take the pressure of her knees,  she takes Ice and Ibuprofen relies the pain, she had multiple steroid injections and Gel in the past which didn't help much, she is scheduled for a Left Knee Total Joint replacement By Dr. Berry on 7/26/22. Covid vaccine series completed, card in chart,(J&J)  covid test was done today. Medical Clearance pending     medications reviewed, instructions given on what medications to take and what not to take., stop meloxicam 7-10 days prior to surgery, she can take Tylenol for pain. ERP fred give.   57 year old female with left knee pain for the last 10 months which got progressively got worse over the last few weeks, complains of 10/10 pain, with all activities, she is using a crutches for the last few days to take the pressure of her knees,  she takes Ice and Ibuprofen relies the pain, she had multiple steroid injections and Gel in the past which didn't help much, she is scheduled for a Left Knee Total Joint replacement By Dr. Berry on 22. Covid vaccine series completed, card in chart,(J&J)  covid test was done today. Medical Clearance pending     medications reviewed, instructions given on what medications to take and what not to take., stop meloxicam 7-10 days prior to surgery, she can take Tylenol for pain. ERP teachinng give. she can take her Lexapro in the AM of surgery, All other meds can be continued till the night before surgery.     CAPRINI VTE 2.0 SCORE [CLOT updated 2019]    AGE RELATED RISK FACTORS                                                       MOBILITY RELATED FACTORS  [ x] Age 41-60 years                                            (1 Point)                    [ ] Bed rest                                                        (1 Point)  [ ] Age: 61-74 years                                           (2 Points)                  [ ] Plaster cast                                                   (2 Points)  [ ] Age= 75 years                                              (3 Points)                    [ ] Bed bound for more than 72 hours                 (2 Points)    DISEASE RELATED RISK FACTORS                                               GENDER SPECIFIC FACTORS  [ x] Edema in the lower extremities                       (1 Point)              [ ] Pregnancy                                                     (1 Point)  [ ] Varicose veins                                               (1 Point)                     [ ] Post-partum < 6 weeks                                   (1 Point)             [ ] BMI > 25 Kg/m2                                            (1 Point)                     [ ] Hormonal therapy  or oral contraception          (1 Point)                 [ ] Sepsis (in the previous month)                        (1 Point)               [ ] History of pregnancy complications                 (1 point)  [ ] Pneumonia or serious lung disease                                               [ ] Unexplained or recurrent                     (1 Point)           (in the previous month)                               (1 Point)  [ ] Abnormal pulmonary function test                     (1 Point)                 SURGERY RELATED RISK FACTORS  [ ] Acute myocardial infarction                              (1 Point)               [ ]  Section                                             (1 Point)  [ ] Congestive heart failure (in the previous month)  (1 Point)      [ ] Minor surgery                                                  (1 Point)   [ ] Inflammatory bowel disease                             (1 Point)               [ ] Arthroscopic surgery                                        (2 Points)  [ ] Central venous access                                      (2 Points)                [ ] General surgery lasting more than 45 minutes (2 points)  [ ] Malignancy- Present or previous                   (2 Points)                [x ] Elective arthroplasty                                         (5 points)    [ ] Stroke (in the previous month)                          (5 Points)                                                                                                                                                           HEMATOLOGY RELATED FACTORS                                                 TRAUMA RELATED RISK FACTORS  [ ] Prior episodes of VTE                                     (3 Points)                [ ] Fracture of the hip, pelvis, or leg                       (5 Points)  [ ] Positive family history for VTE                         (3 Points)             [ ] Acute spinal cord injury (in the previous month)  (5 Points)  [ ] Prothrombin 88721 A                                     (3 Points)               [ ] Paralysis  (less than 1 month)                             (5 Points)  [ ] Factor V Leiden                                             (3 Points)                  [ ] Multiple Trauma within 1 month                        (5 Points)  [ ] Lupus anticoagulants                                     (3 Points)                                                           [ ] Anticardiolipin antibodies                               (3 Points)                                                       [ ] High homocysteine in the blood                      (3 Points)                                             [ ] Other congenital or acquired thrombophilia      (3 Points)                                                [ ] Heparin induced thrombocytopenia                  (3 Points)                                     Total Score [   7       ]  OPIOID RISK TOOL    BO EACH BOX THAT APPLIES AND ADD TOTALS AT THE END    FAMILY HISTORY OF SUBSTANCE ABUSE                 FEMALE         MALE                                                Alcohol                             [  ]1 pt          [  ]3pts                                               Illegal Drugs                     [  ]2 pts        [  ]3pts                                               Rx Drugs                           [  ]4 pts        [  ]4 pts    PERSONAL HISTORY OF SUBSTANCE ABUSE                                                                                          Alcohol                             [  ]3 pts       [  ]3 pts                                               Illegal Drugs                     [  ]4 pts        [  ]4 pts                                               Rx Drugs                           [  ]5 pts        [  ]5 pts    AGE BETWEEN 16-45 YEARS                                      [  ]1 pt         [  ]1 pt    HISTORY OF PREADOLESCENT   SEXUAL ABUSE                                                             [  ]3 pts        [  ]0pts    PSYCHOLOGICAL DISEASE                     ADD, OCD, Bipolar, Schizophrenia        [  ]2 pts         [  ]2 pts                      Depression                                               [  ]1 pt           [  ]1 pt           SCORING TOTAL   (add numbers and type here)              ( 0)                                     A score of 3 or lower indicated LOW risk for future opioid abuse  A score of 4 to 7 indicated moderate risk for future opioid abuse  A score of 8 or higher indicates a high risk for opioid abuse

## 2022-07-22 NOTE — H&P PST ADULT - HISTORY OF PRESENT ILLNESS
57 year old female with left knee pain for the last 10 months which got progressively got worse over the last few weeks, complains of 10/10 pain, with all activities, she is using a crutches for the last few days to take the pressure of her knees,  she takes Ice and Ibuprofen relies the pain, she had multiple steroid injections and Gel in the past which didn't help much, she is scheduled for a Left Knee Total Joint replacement By Dr. Berry on 7/26/22. Covid vaccine series completed, card in chart,(J&J)  covid test was done today. Medical Clearance pending

## 2022-07-25 ENCOUNTER — TRANSCRIPTION ENCOUNTER (OUTPATIENT)
Age: 57
End: 2022-07-25

## 2022-07-25 NOTE — PATIENT PROFILE ADULT - FALL HARM RISK - HARM RISK INTERVENTIONS
Assistance with ambulation/Assistance OOB with selected safe patient handling equipment/Communicate Risk of Fall with Harm to all staff/Discuss with provider need for PT consult/Monitor gait and stability/Provide patient with walking aids - walker, cane, crutches/Reinforce activity limits and safety measures with patient and family/Sit up slowly, dangle for a short time, stand at bedside before walking/Tailored Fall Risk Interventions/Use of alarms - bed, chair and/or voice tab/Visual Cue: Yellow wristband and red socks/Bed in lowest position, wheels locked, appropriate side rails in place/Call bell, personal items and telephone in reach/Instruct patient to call for assistance before getting out of bed or chair/Non-slip footwear when patient is out of bed/[place] to call system/Physically safe environment - no spills, clutter or unnecessary equipment/Purposeful Proactive Rounding/Room/bathroom lighting operational, light cord in reach

## 2022-07-26 ENCOUNTER — TRANSCRIPTION ENCOUNTER (OUTPATIENT)
Age: 57
End: 2022-07-26

## 2022-07-26 ENCOUNTER — APPOINTMENT (OUTPATIENT)
Dept: ORTHOPEDIC SURGERY | Facility: HOSPITAL | Age: 57
End: 2022-07-26

## 2022-07-26 ENCOUNTER — INPATIENT (INPATIENT)
Facility: HOSPITAL | Age: 57
LOS: 0 days | Discharge: HOME | DRG: 470 | End: 2022-07-27
Attending: ORTHOPAEDIC SURGERY | Admitting: ORTHOPAEDIC SURGERY
Payer: COMMERCIAL

## 2022-07-26 VITALS
OXYGEN SATURATION: 96 % | TEMPERATURE: 98 F | SYSTOLIC BLOOD PRESSURE: 90 MMHG | WEIGHT: 102.96 LBS | DIASTOLIC BLOOD PRESSURE: 84 MMHG | HEART RATE: 76 BPM | HEIGHT: 63 IN | RESPIRATION RATE: 16 BRPM

## 2022-07-26 DIAGNOSIS — M17.12 UNILATERAL PRIMARY OSTEOARTHRITIS, LEFT KNEE: ICD-10-CM

## 2022-07-26 DIAGNOSIS — Z98.891 HISTORY OF UTERINE SCAR FROM PREVIOUS SURGERY: Chronic | ICD-10-CM

## 2022-07-26 DIAGNOSIS — Z90.3 ACQUIRED ABSENCE OF STOMACH [PART OF]: Chronic | ICD-10-CM

## 2022-07-26 LAB
ABO RH CONFIRMATION: SIGNIFICANT CHANGE UP
GLUCOSE BLDC GLUCOMTR-MCNC: 120 MG/DL — HIGH (ref 70–99)
GLUCOSE BLDC GLUCOMTR-MCNC: 78 MG/DL — SIGNIFICANT CHANGE UP (ref 70–99)
GLUCOSE BLDC GLUCOMTR-MCNC: 95 MG/DL — SIGNIFICANT CHANGE UP (ref 70–99)

## 2022-07-26 PROCEDURE — 27447 TOTAL KNEE ARTHROPLASTY: CPT | Mod: AS,LT

## 2022-07-26 PROCEDURE — 99222 1ST HOSP IP/OBS MODERATE 55: CPT

## 2022-07-26 PROCEDURE — 20985 CPTR-ASST DIR MS PX: CPT

## 2022-07-26 PROCEDURE — 73560 X-RAY EXAM OF KNEE 1 OR 2: CPT | Mod: 26,LT

## 2022-07-26 PROCEDURE — 27447 TOTAL KNEE ARTHROPLASTY: CPT | Mod: LT

## 2022-07-26 DEVICE — SCREW PSN FEMALE 2.5X25MM: Type: IMPLANTABLE DEVICE | Status: FUNCTIONAL

## 2022-07-26 DEVICE — SCREW HD HEX 3.5MM: Type: IMPLANTABLE DEVICE | Status: FUNCTIONAL

## 2022-07-26 DEVICE — PATELLA VE 29MM: Type: IMPLANTABLE DEVICE | Status: FUNCTIONAL

## 2022-07-26 DEVICE — STEM EXT PERSONA 14MM PLUS 30M: Type: IMPLANTABLE DEVICE | Status: FUNCTIONAL

## 2022-07-26 DEVICE — TIB PSN NP STM 5 DEG SZ DL: Type: IMPLANTABLE DEVICE | Status: FUNCTIONAL

## 2022-07-26 DEVICE — FEM PERSONA PS CMT CCR STD SZ 5 L: Type: IMPLANTABLE DEVICE | Status: FUNCTIONAL

## 2022-07-26 DEVICE — IMPLANTABLE DEVICE: Type: IMPLANTABLE DEVICE | Status: FUNCTIONAL

## 2022-07-26 DEVICE — PIN HEADLESS TROCHAR 3.2X75MM: Type: IMPLANTABLE DEVICE | Status: FUNCTIONAL

## 2022-07-26 DEVICE — CEMENT BONE PALACOS R W/O GENTAMICIN 1X40: Type: IMPLANTABLE DEVICE | Status: FUNCTIONAL

## 2022-07-26 DEVICE — PIN THREADED HEADED STRL: Type: IMPLANTABLE DEVICE | Status: FUNCTIONAL

## 2022-07-26 RX ORDER — CELECOXIB 200 MG/1
200 CAPSULE ORAL EVERY 12 HOURS
Refills: 0 | Status: DISCONTINUED | OUTPATIENT
Start: 2022-07-28 | End: 2022-07-27

## 2022-07-26 RX ORDER — POLYETHYLENE GLYCOL 3350 17 G/17G
17 POWDER, FOR SOLUTION ORAL AT BEDTIME
Refills: 0 | Status: DISCONTINUED | OUTPATIENT
Start: 2022-07-26 | End: 2022-07-27

## 2022-07-26 RX ORDER — FENTANYL CITRATE 50 UG/ML
25 INJECTION INTRAVENOUS
Refills: 0 | Status: DISCONTINUED | OUTPATIENT
Start: 2022-07-26 | End: 2022-07-26

## 2022-07-26 RX ORDER — PANTOPRAZOLE SODIUM 20 MG/1
40 TABLET, DELAYED RELEASE ORAL
Refills: 0 | Status: DISCONTINUED | OUTPATIENT
Start: 2022-07-26 | End: 2022-07-27

## 2022-07-26 RX ORDER — HYDROMORPHONE HYDROCHLORIDE 2 MG/ML
4 INJECTION INTRAMUSCULAR; INTRAVENOUS; SUBCUTANEOUS
Refills: 0 | Status: DISCONTINUED | OUTPATIENT
Start: 2022-07-26 | End: 2022-07-27

## 2022-07-26 RX ORDER — ACETAMINOPHEN 500 MG
1000 TABLET ORAL ONCE
Refills: 0 | Status: DISCONTINUED | OUTPATIENT
Start: 2022-07-26 | End: 2022-07-26

## 2022-07-26 RX ORDER — SODIUM CHLORIDE 9 MG/ML
3 INJECTION INTRAMUSCULAR; INTRAVENOUS; SUBCUTANEOUS EVERY 8 HOURS
Refills: 0 | Status: DISCONTINUED | OUTPATIENT
Start: 2022-07-26 | End: 2022-07-26

## 2022-07-26 RX ORDER — FENTANYL CITRATE 50 UG/ML
50 INJECTION INTRAVENOUS
Refills: 0 | Status: DISCONTINUED | OUTPATIENT
Start: 2022-07-26 | End: 2022-07-26

## 2022-07-26 RX ORDER — CEFAZOLIN SODIUM 1 G
2000 VIAL (EA) INJECTION ONCE
Refills: 0 | Status: DISCONTINUED | OUTPATIENT
Start: 2022-07-26 | End: 2022-07-26

## 2022-07-26 RX ORDER — KETOROLAC TROMETHAMINE 30 MG/ML
15 SYRINGE (ML) INJECTION EVERY 6 HOURS
Refills: 0 | Status: DISCONTINUED | OUTPATIENT
Start: 2022-07-26 | End: 2022-07-27

## 2022-07-26 RX ORDER — CEFAZOLIN SODIUM 1 G
2000 VIAL (EA) INJECTION
Refills: 0 | Status: COMPLETED | OUTPATIENT
Start: 2022-07-26 | End: 2022-07-27

## 2022-07-26 RX ORDER — ONDANSETRON 8 MG/1
4 TABLET, FILM COATED ORAL EVERY 6 HOURS
Refills: 0 | Status: DISCONTINUED | OUTPATIENT
Start: 2022-07-26 | End: 2022-07-27

## 2022-07-26 RX ORDER — CELECOXIB 200 MG/1
400 CAPSULE ORAL ONCE
Refills: 0 | Status: COMPLETED | OUTPATIENT
Start: 2022-07-26 | End: 2022-07-26

## 2022-07-26 RX ORDER — SODIUM CHLORIDE 9 MG/ML
500 INJECTION INTRAMUSCULAR; INTRAVENOUS; SUBCUTANEOUS ONCE
Refills: 0 | Status: COMPLETED | OUTPATIENT
Start: 2022-07-26 | End: 2022-07-26

## 2022-07-26 RX ORDER — MAGNESIUM HYDROXIDE 400 MG/1
30 TABLET, CHEWABLE ORAL DAILY
Refills: 0 | Status: DISCONTINUED | OUTPATIENT
Start: 2022-07-26 | End: 2022-07-27

## 2022-07-26 RX ORDER — TRANEXAMIC ACID 100 MG/ML
1000 INJECTION, SOLUTION INTRAVENOUS ONCE
Refills: 0 | Status: DISCONTINUED | OUTPATIENT
Start: 2022-07-26 | End: 2022-07-26

## 2022-07-26 RX ORDER — ACETAMINOPHEN 500 MG
975 TABLET ORAL ONCE
Refills: 0 | Status: COMPLETED | OUTPATIENT
Start: 2022-07-26 | End: 2022-07-26

## 2022-07-26 RX ORDER — SODIUM CHLORIDE 9 MG/ML
1000 INJECTION INTRAMUSCULAR; INTRAVENOUS; SUBCUTANEOUS
Refills: 0 | Status: DISCONTINUED | OUTPATIENT
Start: 2022-07-26 | End: 2022-07-27

## 2022-07-26 RX ORDER — ACETAMINOPHEN 500 MG
700 TABLET ORAL ONCE
Refills: 0 | Status: DISCONTINUED | OUTPATIENT
Start: 2022-07-26 | End: 2022-07-27

## 2022-07-26 RX ORDER — OXYCODONE HYDROCHLORIDE 5 MG/1
5 TABLET ORAL
Refills: 0 | Status: DISCONTINUED | OUTPATIENT
Start: 2022-07-26 | End: 2022-07-27

## 2022-07-26 RX ORDER — APREPITANT 80 MG/1
40 CAPSULE ORAL ONCE
Refills: 0 | Status: COMPLETED | OUTPATIENT
Start: 2022-07-26 | End: 2022-07-26

## 2022-07-26 RX ORDER — ACETAMINOPHEN 500 MG
975 TABLET ORAL EVERY 8 HOURS
Refills: 0 | Status: DISCONTINUED | OUTPATIENT
Start: 2022-07-26 | End: 2022-07-27

## 2022-07-26 RX ORDER — ASPIRIN/CALCIUM CARB/MAGNESIUM 324 MG
325 TABLET ORAL
Refills: 0 | Status: DISCONTINUED | OUTPATIENT
Start: 2022-07-26 | End: 2022-07-27

## 2022-07-26 RX ORDER — SENNA PLUS 8.6 MG/1
2 TABLET ORAL AT BEDTIME
Refills: 0 | Status: DISCONTINUED | OUTPATIENT
Start: 2022-07-26 | End: 2022-07-27

## 2022-07-26 RX ORDER — VALACYCLOVIR 500 MG/1
500 TABLET, FILM COATED ORAL DAILY
Refills: 0 | Status: DISCONTINUED | OUTPATIENT
Start: 2022-07-26 | End: 2022-07-27

## 2022-07-26 RX ORDER — OXYCODONE HYDROCHLORIDE 5 MG/1
10 TABLET ORAL
Refills: 0 | Status: DISCONTINUED | OUTPATIENT
Start: 2022-07-26 | End: 2022-07-27

## 2022-07-26 RX ORDER — ESCITALOPRAM OXALATE 10 MG/1
10 TABLET, FILM COATED ORAL DAILY
Refills: 0 | Status: DISCONTINUED | OUTPATIENT
Start: 2022-07-26 | End: 2022-07-27

## 2022-07-26 RX ADMIN — Medication 975 MILLIGRAM(S): at 09:53

## 2022-07-26 RX ADMIN — Medication 15 MILLIGRAM(S): at 18:19

## 2022-07-26 RX ADMIN — Medication 975 MILLIGRAM(S): at 21:32

## 2022-07-26 RX ADMIN — FENTANYL CITRATE 25 MICROGRAM(S): 50 INJECTION INTRAVENOUS at 15:10

## 2022-07-26 RX ADMIN — HYDROMORPHONE HYDROCHLORIDE 4 MILLIGRAM(S): 2 INJECTION INTRAMUSCULAR; INTRAVENOUS; SUBCUTANEOUS at 21:31

## 2022-07-26 RX ADMIN — FENTANYL CITRATE 25 MICROGRAM(S): 50 INJECTION INTRAVENOUS at 15:15

## 2022-07-26 RX ADMIN — HYDROMORPHONE HYDROCHLORIDE 4 MILLIGRAM(S): 2 INJECTION INTRAMUSCULAR; INTRAVENOUS; SUBCUTANEOUS at 22:30

## 2022-07-26 RX ADMIN — Medication 975 MILLIGRAM(S): at 22:30

## 2022-07-26 RX ADMIN — SODIUM CHLORIDE 500 MILLILITER(S): 9 INJECTION INTRAMUSCULAR; INTRAVENOUS; SUBCUTANEOUS at 15:00

## 2022-07-26 RX ADMIN — FENTANYL CITRATE 25 MICROGRAM(S): 50 INJECTION INTRAVENOUS at 15:00

## 2022-07-26 RX ADMIN — Medication 325 MILLIGRAM(S): at 18:33

## 2022-07-26 RX ADMIN — APREPITANT 40 MILLIGRAM(S): 80 CAPSULE ORAL at 09:51

## 2022-07-26 RX ADMIN — Medication 100 MILLIGRAM(S): at 18:20

## 2022-07-26 RX ADMIN — CELECOXIB 400 MILLIGRAM(S): 200 CAPSULE ORAL at 09:51

## 2022-07-26 RX ADMIN — Medication 15 MILLIGRAM(S): at 23:11

## 2022-07-26 RX ADMIN — Medication 15 MILLIGRAM(S): at 23:12

## 2022-07-26 NOTE — DISCHARGE NOTE PROVIDER - NSDCMRMEDTOKEN_GEN_ALL_CORE_FT
Lexapro 10 mg oral tablet: 1 tab(s) orally once a day  meloxicam 10 mg oral capsule: 1 cap(s) orally once a day  omeprazole 40 mg oral delayed release capsule: 1 cap(s) orally once a day  Valtrex 500 mg oral tablet: 1 tab(s) orally once a day, As Needed   acetaminophen 325 mg oral tablet: 3 tab(s) orally every 8 hours  Aspirin Enteric Coated 325 mg oral delayed release tablet: 1 tab(s) orally 2 times a day MDD:2  CeleBREX 200 mg oral capsule: 1 cap(s) orally 2 times a day x 21 days MDD:2  Lexapro 10 mg oral tablet: 1 tab(s) orally once a day  omeprazole 40 mg oral delayed release capsule: 1 cap(s) orally once a day  Senna S 50 mg-8.6 mg oral tablet: 2 tab(s) orally once a day (at bedtime) -for constipation MDD:2  Valtrex 500 mg oral tablet: 1 tab(s) orally once a day, As Needed   acetaminophen 325 mg oral tablet: 3 tab(s) orally every 8 hours  Aspirin Enteric Coated 325 mg oral delayed release tablet: 1 tab(s) orally 2 times a day MDD:2  CeleBREX 200 mg oral capsule: 1 cap(s) orally 2 times a day x 21 days MDD:2  Lexapro 10 mg oral tablet: 1 tab(s) orally once a day  omeprazole 40 mg oral delayed release capsule: 1 cap(s) orally once a day  Senna S 50 mg-8.6 mg oral tablet: 2 tab(s) orally once a day (at bedtime) -for constipation MDD:2  traMADol 50 mg oral tablet: 1-2 tab(s) orally every 6 hours, As Needed -for moderate/severe pain MDD:8  Valtrex 500 mg oral tablet: 1 tab(s) orally once a day, As Needed

## 2022-07-26 NOTE — DISCHARGE NOTE NURSING/CASE MANAGEMENT/SOCIAL WORK - NSDCPEFALRISK_GEN_ALL_CORE
For information on Fall & Injury Prevention, visit: [email or web address] OR  [email or web address] OR  https://www.cdc.gov/steadi/patient.html

## 2022-07-26 NOTE — DISCHARGE NOTE PROVIDER - HOSPITAL COURSE
The patient underwent a Left TOTAL KNEE REPLACEMENT on 7/26. The patient received antibiotics consistent with SCIP guidelines. The patient underwent the procedure and had no intra-operative complications. Post-operatively, the patient was seen by medicine and PT. The patient received ASPIRIN for DVTP. The patient received pain medications per orthopedic pain management pathway and the pain was appropriately controlled. The patient did not have any post-operative medical complications. The patient was discharged in stable condition.

## 2022-07-26 NOTE — DISCHARGE NOTE PROVIDER - NSDCFUSCHEDAPPT_GEN_ALL_CORE_FT
Suleman Berry  [facility]  ORTHOSURG [address]  Scheduled Appointment: 08/17/2022    Elebiary, Yeon J  [facility]  ORTHOSURG [address]  Scheduled Appointment: 09/13/2022    Suleman Berry  [facility]  ORTHOSURG [address]  Scheduled Appointment: 10/17/2022

## 2022-07-26 NOTE — DISCHARGE NOTE PROVIDER - CARE PROVIDER_API CALL
Suleman Berry)  Orthopaedic Surgery  [address] [facility] [address], [address]  [place] [address]  Phone: [phone]  Fax: [phone]  Follow Up Time:

## 2022-07-26 NOTE — PHYSICAL THERAPY INITIAL EVALUATION ADULT - ADDITIONAL COMMENTS
Pt reports living in private home with  who is able to assist but reports also setting up a home health aide for 8 hrs for a couple of days. pt has 3 VLADIMIR with no handrail and no stairs inside all needs met 1st floor. Independent prior. Owns no DME.

## 2022-07-26 NOTE — PHYSICAL THERAPY INITIAL EVALUATION ADULT - GENERAL OBSERVATIONS, REHAB EVAL
Pt received in bed, + IV, + b/l VCBs, breathing on RA in NAD, in 3-4/10 left knee/ankle pain, agreeable to PT evaluation

## 2022-07-26 NOTE — CONSULT NOTE ADULT - ASSESSMENT
57 year old female with PMH of GERD , Major Depression , cold sore / herpes simplex , c/o  left knee pain for the last 10 months which got progressively got worse over the last few weeks, complains of 10/10 pain, with all activities, she is using a crutches for the last few days to take the pressure of her knees,  she takes Ice and Ibuprofen relies the pain, she had multiple steroid injections and Gel in the past which didn't help much,    1. L knee chronic pain / OA,   S/P L TKA ,   PT/OT/pain mgmt  DVT prophylaxis- as per ortho  Abx as per SCIP  Incentive spirometry  Prophylaxis of opioid  induced constipation.      2. GERD - continue PPI    3. Hx of Depression - continue home dose Lexapro    4. DVT prophylaxis  - as per ortho protocol  Opioid induced constipation  prophylaxis - bowel regimen     Thank you for the courtesy of this consult ,   will follow along with you .

## 2022-07-26 NOTE — DISCHARGE NOTE PROVIDER - NSDCFUADDINST_GEN_ALL_CORE_FT
The patient will be seen in the office between 2-3 weeks for wound check.   **Your first post-operative visit has been scheduled prior to your admission. PLEASE CONTACT OFFICE TO CONFIRM THE APPOINTMENT DATE.   **  The silver based dressing is to be removed 7 days from the date of surgery. 8/2/2022  ** CONTACT THE OFFICE IF THE FOLLOWING DEVELOP:  - the dressing becomes soiled or saturated  - you develop a fever greater that 101F  - the wound becomes red or you develop blistering around the wound  * Patient may shower after post-op day #3. 7/29  * The patient will continue home PT consistent with  total knee replacement protocol. Transition to outpatient PT will occur at the time of the first office visit.   * The patient will practice knee extension exercises regularly to minimize hamstring contraction.   * The patient is FULL weight bearing.  * The patient will continue ASPIRIN for 6 weeks after surgery for blood clot prevention. 8/6/2022  *** While on aspirin, the patient will take daily omeprazole or other similar medication to protect the stomach from irritation.   * The patient will take OXYCODONE AND TYLENOL for pain control and adjust according to prescription and patient needs. Contact the office if pain increases while taking prescribed pain medications or related concerns develop.  * Celebrex will be taken twice daily for 3 weeks for pain control and prevention of excessive bone growth. Additional prescription may be requested at your office follow-up visit.   * The patient will take Senna S while taking oxycodone to prevent narcotic associated constipation.  Additionally, increase water intake (drink at least 8 glasses of water daily) and try adding fiber to the diet by eating fruits, vegetables and foods that are rich in grains. If constipation is experienced, contact the medical/primary care provider to discuss further treatment options.  * To avoid injury at home:  - continue use of rolling walker until cleared by physical therapist  - have family or friend remove all throw rug or objects in hallways that may present a trip hazard.  - if you experience any dizziness or medical concerns, call your medical doctor or  911.  * The implant may activate metal detection devices.  The patient will be seen in the office between 2-3 weeks for wound check.   **Your first post-operative visit has been scheduled prior to your admission. PLEASE CONTACT OFFICE TO CONFIRM THE APPOINTMENT DATE.   **  The silver based dressing is to be removed 7 days from the date of surgery. 8/2/2022  ** CONTACT THE OFFICE IF THE FOLLOWING DEVELOP:  - the dressing becomes soiled or saturated  - you develop a fever greater that 101F  - the wound becomes red or you develop blistering around the wound  * Patient may shower after post-op day #3. 7/29  * The patient will continue home PT consistent with  total knee replacement protocol. Transition to outpatient PT will occur at the time of the first office visit.   * The patient will practice knee extension exercises regularly to minimize hamstring contraction.   * The patient is FULL weight bearing.  * The patient will continue ASPIRIN for 6 weeks after surgery for blood clot prevention. 9/6/2022  *** While on aspirin, the patient will take daily omeprazole or other similar medication to protect the stomach from irritation.   * The patient will take OXYCODONE AND TYLENOL for pain control and adjust according to prescription and patient needs. Contact the office if pain increases while taking prescribed pain medications or related concerns develop.  * Celebrex will be taken twice daily for 3 weeks for pain control and prevention of excessive bone growth. Additional prescription may be requested at your office follow-up visit.   * The patient will take Senna S while taking oxycodone to prevent narcotic associated constipation.  Additionally, increase water intake (drink at least 8 glasses of water daily) and try adding fiber to the diet by eating fruits, vegetables and foods that are rich in grains. If constipation is experienced, contact the medical/primary care provider to discuss further treatment options.  * To avoid injury at home:  - continue use of rolling walker until cleared by physical therapist  - have family or friend remove all throw rug or objects in hallways that may present a trip hazard.  - if you experience any dizziness or medical concerns, call your medical doctor or  911.  * The implant may activate metal detection devices.  The patient will be seen in the office between 2-3 weeks for wound check.   **Your first post-operative visit has been scheduled prior to your admission. PLEASE CONTACT OFFICE TO CONFIRM THE APPOINTMENT DATE.   **  The silver based dressing is to be removed 7 days from the date of surgery. 8/2/2022  ** CONTACT THE OFFICE IF THE FOLLOWING DEVELOP:  - the dressing becomes soiled or saturated  - you develop a fever greater that 101F  - the wound becomes red or you develop blistering around the wound  * Patient may shower after post-op day #3. 7/29  * The patient will continue home PT consistent with  total knee replacement protocol. Transition to outpatient PT will occur at the time of the first office visit.   * The patient will practice knee extension exercises regularly to minimize hamstring contraction.   * The patient is FULL weight bearing.  * The patient will continue ASPIRIN for 6 weeks after surgery for blood clot prevention. 9/6/2022  *** While on aspirin, the patient will take daily omeprazole or other similar medication to protect the stomach from irritation.   * The patient will take TRAMADOL AND TYLENOL for pain control and adjust according to prescription and patient needs. Contact the office if pain increases while taking prescribed pain medications or related concerns develop.  * Celebrex will be taken twice daily for 3 weeks for pain control and prevention of excessive bone growth. Additional prescription may be requested at your office follow-up visit.   * The patient will take Senna S while taking oxycodone to prevent narcotic associated constipation.  Additionally, increase water intake (drink at least 8 glasses of water daily) and try adding fiber to the diet by eating fruits, vegetables and foods that are rich in grains. If constipation is experienced, contact the medical/primary care provider to discuss further treatment options.  * To avoid injury at home:  - continue use of rolling walker until cleared by physical therapist  - have family or friend remove all throw rug or objects in hallways that may present a trip hazard.  - if you experience any dizziness or medical concerns, call your medical doctor or  911.  * The implant may activate metal detection devices.

## 2022-07-26 NOTE — CONSULT NOTE ADULT - SUBJECTIVE AND OBJECTIVE BOX
Patient is a 57y old  Female who is s/p L TKA , POD #0 . Tolerated procedure well . Seen and examined on PACU .     CC: chronic L knee pain       HPI:  57 year old female with PMH of GERD , Major Depressinn , cold sore / herpes simplex , c/o  left knee pain for the last 10 months which got progressively got worse over the last few weeks, complains of 10/10 pain, with all activities, she is using a crutches for the last few days to take the pressure of her knees,  she takes Ice and Ibuprofen relies the pain, she had multiple steroid injections and Gel in the past which didn't help much,      PAST MEDICAL & SURGICAL HISTORY:  GERD (gastroesophageal reflux disease)      Herpes simplex      Major depression      S/P       History of gastrectomy  partial          Social History:  Tobacco - smokes 12 PPD X 20  yrs   ETOH -   Illicit drug abuoccasionally se - denies    FAMILY HISTORY:  Family history of lung cancer (Father)        Allergies    No Known Allergies    Intolerances        HOME MEDICATIONS :     Lexapro 10 mg oral tablet: 1 tab(s) orally once a day (2022 09:36)  meloxicam 10 mg oral capsule: 1 cap(s) orally once a day (2022 09:36)  omeprazole 40 mg oral delayed release capsule: 1 cap(s) orally once a day (2022 09:36)  Valtrex 500 mg oral tablet: 1 tab(s) orally once a day, As Needed (2022 09:36)        MEDICATIONS  (STANDING):  ceFAZolin   IVPB 2000 milliGRAM(s) IV Intermittent <User Schedule>    MEDICATIONS  (PRN):  fentaNYL    Injectable 25 MICROGram(s) IV Push every 5 minutes PRN Moderate Pain (4 - 6)  fentaNYL    Injectable 50 MICROGram(s) IV Push every 10 minutes PRN Severe Pain (7 - 10)      LABS: Pending       RADIOLOGY & ADDITIONAL TESTS:    < from: Xray Knee 1 or 2 Views, Left (22 @ 14:36) >    ACC: [id number] EXAM:  XR KNEE 1-2 VIEWS LT                          PROCEDURE DATE:  2022          INTERPRETATION:  Left knee.    Postoperative AP and lateral views show a knee replacement with good   alignment. No bone destruction or fracture.    IMPRESSION: Left knee replacement.    --- End of Report ---            LOR MATUTE MD; Attending Radiologist  This document has been electronically signed. 2022  3:02PM    < end of copied text >          Vital Signs Last 24 Hrs  T(C): 36.8 (2022 13:50), Max: 36.8 (2022 13:50)  T(F): 98.2 (2022 13:50), Max: 98.2 (2022 13:50)  HR: 80 (2022 15:15) (62 - 80)  BP: 106/57 (2022 15:00) (90/84 - 118/60)  BP(mean): 69 (2022 15:00) (64 - 92)  RR: 14 (2022 15:15) (14 - 16)  SpO2: 99% (2022 15:15) (96% - 99%)    Parameters below as of 2022 15:15  Patient On (Oxygen Delivery Method): room air      PHYSICAL EXAM:    GENERAL: NAD, well-groomed, well-developed  HEAD:  Atraumatic, Normocephalic  EYES: EOMI, PERRLA, conjunctiva and sclera clear  NECK: Supple, No JVD, Normal thyroid  NERVOUS SYSTEM:  Alert & Oriented X3, no focal deficit  CHEST/LUNG: CTA b/l ,  no  rales, rhonchi, wheezing, or rubs  HEART: Regular rate and rhythm; No murmurs, rubs, or gallops  ABDOMEN: Soft, Nontender, Nondistended; Bowel sounds present  EXTREMITIES:  2+ Peripheral Pulses, No clubbing, cyanosis, or edema,   L knee dressing + , clean and dry   LYMPH: No lymphadenopathy noted  SKIN: No rashes or lesions      REVIEW OF SYSTEMS:    As above , all systema are reviewed and are negative .         PHYSICAL EXAM:    GENERAL: NAD, well-groomed, well-developed  HEAD:  Atraumatic, Normocephalic  EYES: EOMI, PERRLA, conjunctiva and sclera clear  NECK: Supple, No JVD, Normal thyroid  NERVOUS SYSTEM:  Alert & Oriented X3, no focal deficit   CHEST/LUNG: CTA  b/l,  no rales, rhonchi, wheezing, or rubs  HEART: Regular rate and rhythm; No murmurs, rubs, or gallops  ABDOMEN: Soft, Nontender, Nondistended; Bowel sounds present  EXTREMITIES:  2+ Peripheral Pulses, No clubbing, cyanosis, or edema ,   LYMPH: No lymphadenopathy noted  SKIN: No rashes or lesions    LABS: Pending       RADIOLOGY & ADDITIONAL STUDIES:    < from: Xray Knee 1 or 2 Views, Left (22 @ 14:36) >    ACC: [id number] EXAM:  XR KNEE 1-2 VIEWS LT                          PROCEDURE DATE:  2022          INTERPRETATION:  Left knee.    Postoperative AP and lateral views show a knee replacement with good   alignment. No bone destruction or fracture.    IMPRESSION: Left knee replacement.    --- End of Report ---            LOR MATUTE MD; Attending Radiologist  This document has been electronically signed. 2022  3:02PM    < end of copied text >

## 2022-07-26 NOTE — PHYSICAL THERAPY INITIAL EVALUATION ADULT - RANGE OF MOTION EXAMINATION, REHAB EVAL
Left Knee flexion/extension decreased by ~25% otherwise WNL/Right LE ROM was WNL (within normal limits)

## 2022-07-26 NOTE — DISCHARGE NOTE NURSING/CASE MANAGEMENT/SOCIAL WORK - PATIENT PORTAL LINK FT
You can access the FollowMyHealth Patient Portal offered by [facility] by registering at the following website: [email or web address]. By joining [employer]’s FollowMyHealth portal, you will also be able to view your health information using other applications (apps) compatible with our system.

## 2022-07-27 VITALS
RESPIRATION RATE: 18 BRPM | OXYGEN SATURATION: 100 % | DIASTOLIC BLOOD PRESSURE: 57 MMHG | HEART RATE: 81 BPM | SYSTOLIC BLOOD PRESSURE: 103 MMHG | TEMPERATURE: 98 F

## 2022-07-27 LAB
ANION GAP SERPL CALC-SCNC: 8 MMOL/L — SIGNIFICANT CHANGE UP (ref 5–17)
BUN SERPL-MCNC: 13.9 MG/DL — SIGNIFICANT CHANGE UP (ref 8–20)
CALCIUM SERPL-MCNC: 8.1 MG/DL — LOW (ref 8.4–10.5)
CHLORIDE SERPL-SCNC: 105 MMOL/L — SIGNIFICANT CHANGE UP (ref 98–107)
CO2 SERPL-SCNC: 26 MMOL/L — SIGNIFICANT CHANGE UP (ref 22–29)
CREAT SERPL-MCNC: 0.39 MG/DL — LOW (ref 0.5–1.3)
EGFR: 115 ML/MIN/1.73M2 — SIGNIFICANT CHANGE UP
GLUCOSE SERPL-MCNC: 116 MG/DL — HIGH (ref 70–99)
HCT VFR BLD CALC: 26 % — LOW (ref 34.5–45)
HGB BLD-MCNC: 8.5 G/DL — LOW (ref 11.5–15.5)
MCHC RBC-ENTMCNC: 32.2 PG — SIGNIFICANT CHANGE UP (ref 27–34)
MCHC RBC-ENTMCNC: 32.7 GM/DL — SIGNIFICANT CHANGE UP (ref 32–36)
MCV RBC AUTO: 98.5 FL — SIGNIFICANT CHANGE UP (ref 80–100)
PLATELET # BLD AUTO: 229 K/UL — SIGNIFICANT CHANGE UP (ref 150–400)
POTASSIUM SERPL-MCNC: 4 MMOL/L — SIGNIFICANT CHANGE UP (ref 3.5–5.3)
POTASSIUM SERPL-SCNC: 4 MMOL/L — SIGNIFICANT CHANGE UP (ref 3.5–5.3)
RBC # BLD: 2.64 M/UL — LOW (ref 3.8–5.2)
RBC # FLD: 12.8 % — SIGNIFICANT CHANGE UP (ref 10.3–14.5)
SODIUM SERPL-SCNC: 139 MMOL/L — SIGNIFICANT CHANGE UP (ref 135–145)
WBC # BLD: 10.09 K/UL — SIGNIFICANT CHANGE UP (ref 3.8–10.5)
WBC # FLD AUTO: 10.09 K/UL — SIGNIFICANT CHANGE UP (ref 3.8–10.5)

## 2022-07-27 PROCEDURE — 80048 BASIC METABOLIC PNL TOTAL CA: CPT

## 2022-07-27 PROCEDURE — 73560 X-RAY EXAM OF KNEE 1 OR 2: CPT

## 2022-07-27 PROCEDURE — 36415 COLL VENOUS BLD VENIPUNCTURE: CPT

## 2022-07-27 PROCEDURE — 99232 SBSQ HOSP IP/OBS MODERATE 35: CPT

## 2022-07-27 PROCEDURE — 82962 GLUCOSE BLOOD TEST: CPT

## 2022-07-27 PROCEDURE — 85027 COMPLETE CBC AUTOMATED: CPT

## 2022-07-27 PROCEDURE — C1776: CPT

## 2022-07-27 PROCEDURE — C1713: CPT

## 2022-07-27 RX ORDER — ASPIRIN/CALCIUM CARB/MAGNESIUM 324 MG
1 TABLET ORAL
Qty: 84 | Refills: 0
Start: 2022-07-27 | End: 2022-09-06

## 2022-07-27 RX ORDER — ACETAMINOPHEN 500 MG
3 TABLET ORAL
Qty: 0 | Refills: 0 | DISCHARGE
Start: 2022-07-27

## 2022-07-27 RX ORDER — MELOXICAM 15 MG/1
1 TABLET ORAL
Qty: 0 | Refills: 0 | DISCHARGE

## 2022-07-27 RX ORDER — TRAMADOL HYDROCHLORIDE 50 MG/1
50 TABLET ORAL EVERY 6 HOURS
Refills: 0 | Status: DISCONTINUED | OUTPATIENT
Start: 2022-07-27 | End: 2022-07-27

## 2022-07-27 RX ORDER — TRAMADOL HYDROCHLORIDE 50 MG/1
1 TABLET ORAL
Qty: 28 | Refills: 0
Start: 2022-07-27 | End: 2022-08-02

## 2022-07-27 RX ORDER — TRAMADOL HYDROCHLORIDE 50 MG/1
100 TABLET ORAL EVERY 6 HOURS
Refills: 0 | Status: DISCONTINUED | OUTPATIENT
Start: 2022-07-27 | End: 2022-07-27

## 2022-07-27 RX ORDER — CELECOXIB 200 MG/1
1 CAPSULE ORAL
Qty: 42 | Refills: 0
Start: 2022-07-27 | End: 2022-08-16

## 2022-07-27 RX ORDER — SENNOSIDES/DOCUSATE SODIUM 8.6MG-50MG
2 TABLET ORAL
Qty: 14 | Refills: 0
Start: 2022-07-27 | End: 2022-08-02

## 2022-07-27 RX ADMIN — Medication 325 MILLIGRAM(S): at 05:30

## 2022-07-27 RX ADMIN — Medication 975 MILLIGRAM(S): at 06:15

## 2022-07-27 RX ADMIN — Medication 975 MILLIGRAM(S): at 05:30

## 2022-07-27 RX ADMIN — PANTOPRAZOLE SODIUM 40 MILLIGRAM(S): 20 TABLET, DELAYED RELEASE ORAL at 05:31

## 2022-07-27 RX ADMIN — Medication 1 TABLET(S): at 11:36

## 2022-07-27 RX ADMIN — ESCITALOPRAM OXALATE 10 MILLIGRAM(S): 10 TABLET, FILM COATED ORAL at 11:36

## 2022-07-27 RX ADMIN — Medication 15 MILLIGRAM(S): at 06:15

## 2022-07-27 RX ADMIN — Medication 100 MILLIGRAM(S): at 02:46

## 2022-07-27 RX ADMIN — Medication 15 MILLIGRAM(S): at 05:30

## 2022-07-27 RX ADMIN — Medication 15 MILLIGRAM(S): at 11:36

## 2022-07-27 NOTE — PROGRESS NOTE ADULT - SUBJECTIVE AND OBJECTIVE BOX
Orthopedic PA Postop Note  Patient S/P LEFT TKA  Patient in bed comfortable   LEFT Leg  Dressing C/D/I - ACE wrap without staining  DP Pulse intact   Calf Soft NT  Dorsi/Plantar Flexion/EHL/FHL intact   Sensation intact to light touch    Vital Signs Last 24 Hrs  T(C): 36.6 (26 Jul 2022 17:02), Max: 36.9 (26 Jul 2022 15:15)  T(F): 97.9 (26 Jul 2022 17:02), Max: 98.4 (26 Jul 2022 15:15)  HR: 78 (26 Jul 2022 17:02) (62 - 80)  BP: 130/65 (26 Jul 2022 17:02) (90/84 - 130/65)  BP(mean): 59 (26 Jul 2022 16:30) (59 - 92)  RR: 18 (26 Jul 2022 17:02) (14 - 18)  SpO2: 93% (26 Jul 2022 17:02) (93% - 99%)    Parameters below as of 26 Jul 2022 17:02  Patient On (Oxygen Delivery Method): room air    < from: Xray Knee 1 or 2 Views, Left (07.26.22 @ 14:36) >    ACC: [id number] EXAM:  XR KNEE 1-2 VIEWS LT                          PROCEDURE DATE:  07/26/2022          INTERPRETATION:  Left knee.    Postoperative AP and lateral views show a knee replacement with good   alignment. No bone destruction or fracture.    IMPRESSION: Left knee replacement.    --- End of Report ---            LOR MATUTE MD; Attending Radiologist  This document has been electronically signed. Jul 26 2022  3:02PM    < end of copied text >      A/P:  S/P LEFT TKA  1. DVTP - ASA  2. Physical Therapy   3. Pain Control as clinically indicated 
CHARLES DENIS  [id number]    History: 57y Female is status post left total knee arthroplasty on 7/26, POD # 1. Patient is doing well and is comfortable. The patient's pain is controlled using the prescribed pain medications. Patient does not want oxycodone for discharge, states it is too strong. Discussed alternatives and patient would like to try Tramadol. The patient is participating in physical therapy. Denies nausea, vomiting, chest pain, shortness of breath, abdominal pain or fever. No new complaints.                          8.5    10.09 )-----------( 229      ( 27 Jul 2022 06:13 )             26.0                 Physical exam: The left knee dressing is clean, dry and intact. No drainage or discharge. No erythema is noted. No blistering. No ecchymosis. The calf is supple nontender. Sensation to light touch is grossly intact distally. + plantar/dorsi/EHL/FHL. No foot drop. 2+ dorsalis pedis pulse. Cap refill < 2 seconds. No cyanosis.        Primary Orthopedic Assessment:  • s/p LEFT total knee replacement, POD#1        Plan:   * trial tramadol  • DVT prophylaxis as prescribed, including use of compression devices and ankle pumps  • Continue physical therapy  • Weightbearing as tolerated of the left lower extremity with assistance of a walker  • Incentive spirometry encouraged  • Pain control as clinically indicated  • Discharge planning – anticipated discharge is Home today when cleared by PT and medicine  
Chief Complaint:    HPI:    Patient is a 57 year old female with a past medical history of GERD, major depression, and cold sore/herpes simplex,  who is s/p left knee total joint replacement POD#1.  Pain management consulted yesterday 2022 since the patient reported that she was on Suboxone. Of note, the patient takes suboxone about once every 1 to 2 weeks as needed.  Patient reports that her pain is currently tolerable.     Interval Hx:  Patient seen during rounds  Patient reports pain to be controlled on current medications  Patient denies sedation with medications      PAST MEDICAL & SURGICAL HISTORY:  GERD (gastroesophageal reflux disease)      Herpes simplex      Major depression      S/P       History of gastrectomy  partial          FAMILY HISTORY:  Family history of lung cancer (Father)        SOCIAL HISTORY:  [ ] Denies Smoking, Alcohol, or Drug Use    Allergies    No Known Allergies    Intolerances        PAIN MEDICATIONS:  acetaminophen     Tablet .. 975 milliGRAM(s) Oral every 8 hours  acetaminophen   IVPB .. 700 milliGRAM(s) IV Intermittent once  aspirin 325 milliGRAM(s) Oral two times a day  escitalopram 10 milliGRAM(s) Oral daily  HYDROmorphone   Tablet 4 milliGRAM(s) Oral every 3 hours PRN  ondansetron Injectable 4 milliGRAM(s) IV Push every 6 hours PRN  oxyCODONE    IR 5 milliGRAM(s) Oral every 3 hours PRN  oxyCODONE    IR 10 milliGRAM(s) Oral every 3 hours PRN  traMADol 50 milliGRAM(s) Oral every 6 hours PRN  traMADol 100 milliGRAM(s) Oral every 6 hours PRN    Heme:    Antibiotics:  valACYclovir 500 milliGRAM(s) Oral daily PRN    Cardiovascular:    GI:  magnesium hydroxide Suspension 30 milliLiter(s) Oral daily PRN  pantoprazole    Tablet 40 milliGRAM(s) Oral before breakfast  polyethylene glycol 3350 17 Gram(s) Oral at bedtime  senna 2 Tablet(s) Oral at bedtime    Endocrine:    All Other Medications:  multivitamin 1 Tablet(s) Oral daily  sodium chloride 0.9%. 1000 milliLiter(s) IV Continuous <Continuous>          Vital Signs Last 24 Hrs  T(C): 36.7 (2022 08:20), Max: 36.9 (2022 15:15)  T(F): 98 (2022 08:20), Max: 98.4 (2022 15:15)  HR: 78 (2022 09:09) (62 - 80)  BP: 110/63 (2022 09:09) (97/52 - 130/65)  BP(mean): 59 (2022 16:30) (59 - 92)  RR: 18 (2022 08:20) (14 - 18)  SpO2: 96% (2022 08:20) (93% - 99%)    Parameters below as of 2022 08:20  Patient On (Oxygen Delivery Method): room air        PAIN SCORE:         SCALE USED: (1-10 VNRS)             PHYSICAL EXAM:    GENERAL: NAD, well-groomed, well-developed  HEAD:  Atraumatic, Normocephalic  EYES: EOMI, PERRLA, conjunctiva and sclera clear  ENMT: No tonsillar erythema, exudates, or enlargement; Moist mucous membranes, Good dentition, No lesions  NECK: Supple, No JVD, Normal thyroid  NERVOUS SYSTEM:  Alert & Oriented X3, Good concentration; Motor Strength 5/5 B/L upper and lower extremities; DTRs 2+ intact and symmetric  CHEST/LUNG: Clear to percussion bilaterally; No rales, rhonchi, wheezing, or rubs  HEART: Regular rate and rhythm; No murmurs, rubs, or gallops  ABDOMEN: Soft, Nontender, Nondistended; Bowel sounds present  EXTREMITIES:  2+ Peripheral Pulses, No clubbing, cyanosis, or edema, L knee dressing clean and dry.  LYMPH: No lymphadenopathy noted  SKIN: No rashes or lesions          LABS:                          8.5    10.09 )-----------( 229      ( 2022 06:13 )             26.0     07-    139  |  105  |  13.9  ----------------------------<  116<H>  4.0   |  26.0  |  0.39<L>    Ca    8.1<L>      2022 06:13            Assessment and Recommendations:  Assessment  Patient is a 57 year old female with a past medical history of GERD, major depression, and cold sore/herpes simplex,  who is s/p left knee total joint replacement POD#1.  Pain management consulted yesterday 2022 since the patient reported that she was on Suboxone. Of note, the patient takes suboxone about once every 1 to 2 weeks as needed.  Patient reports that her pain is currently tolerable. She is pending discharge.     Patient states that she last took Suboxone about 10 days ago. She rarely takes Suboxone about once every 1 to 2 weeks, if she feels that she needs it. Patient stated that she felt that she had some problems with illicit drug use about 3 years ago, so she made the decision to stop using and detoxed on her own. Of note, the patient also mentioned that she does not want to be discharged home on opioids. Patient generally used ice applications and ibuprofen 800mg po prn to help alleviate her pain before she had surgery. However, this is up to the primary team.     Pain controlled  Pain service will sign off  Please reconsult as needed     Thank you.  Please call pain management with any questions: [phone]
Chief Complaint: Chronic L knee pain.    HPI:  Patient is a 57 year old female with a past medical history of GERD, major depression, and cold sore/herpes simplex,  who is s/p left knee total joint replacement POD#0.  Pain management was asked to consult since the patient stated that she takes Suboxone. Patient appears to be in quite uncomfortable. She is reporting that her pain is 9/10.    Patient with left knee pain for the last 10 months which got progressively got worse over the last few weeks, complains of 10/10 pain, with all activities, she is using a crutches for the last few days to take the pressure of her knees,  she takes Ice and Ibuprofen relies the pain, she had multiple steroid injections and Gel in the past which didn't help much.      PAST MEDICAL & SURGICAL HISTORY:  GERD (gastroesophageal reflux disease)    Herpes simplex    Major depression    S/P     History of gastrectomy, partial        FAMILY HISTORY:  Family history of lung cancer (Father)        Allergies    No Known Allergies    Intolerances        PAIN MEDICATIONS:  acetaminophen     Tablet .. 975 milliGRAM(s) Oral every 8 hours  acetaminophen   IVPB .. 1000 milliGRAM(s) IV Intermittent once  aspirin 325 milliGRAM(s) Oral two times a day  escitalopram 10 milliGRAM(s) Oral daily  HYDROmorphone   Tablet 4 milliGRAM(s) Oral every 3 hours PRN  ketorolac   Injectable 15 milliGRAM(s) IV Push every 6 hours  ondansetron Injectable 4 milliGRAM(s) IV Push every 6 hours PRN  oxyCODONE    IR 5 milliGRAM(s) Oral every 3 hours PRN  oxyCODONE    IR 10 milliGRAM(s) Oral every 3 hours PRN    Heme:    Antibiotics:  ceFAZolin   IVPB 2000 milliGRAM(s) IV Intermittent <User Schedule>  valACYclovir 500 milliGRAM(s) Oral daily PRN    Cardiovascular:    GI:  magnesium hydroxide Suspension 30 milliLiter(s) Oral daily PRN  pantoprazole    Tablet 40 milliGRAM(s) Oral before breakfast  polyethylene glycol 3350 17 Gram(s) Oral at bedtime  senna 2 Tablet(s) Oral at bedtime    Endocrine:    All Other Medications:  multivitamin 1 Tablet(s) Oral daily  sodium chloride 0.9%. 1000 milliLiter(s) IV Continuous <Continuous>      Vital Signs Last 24 Hrs  T(C): 36.6 (2022 17:02), Max: 36.9 (2022 15:15)  T(F): 97.9 (2022 17:02), Max: 98.4 (2022 15:15)  HR: 78 (2022 17:02) (62 - 80)  BP: 130/65 (2022 17:02) (90/84 - 130/65)  BP(mean): 59 (2022 16:30) (59 - 92)  RR: 18 (2022 17:02) (14 - 18)  SpO2: 93% (2022 17:02) (93% - 99%)    Parameters below as of 2022 17:02  Patient On (Oxygen Delivery Method): room air        PAIN SCORE:    9     SCALE USED: (1-10 VNRS)             PHYSICAL EXAM:    GENERAL: NAD, well-groomed, well-developed  HEAD:  Atraumatic, Normocephalic  EYES: EOMI, PERRLA, conjunctiva and sclera clear  ENMT: No tonsillar erythema, exudates, or enlargement; Moist mucous membranes, Good dentition, No lesions  NECK: Supple, No JVD, Normal thyroid  NERVOUS SYSTEM:  Alert & Oriented X3, Good concentration; Motor Strength 5/5 B/L upper and lower extremities; DTRs 2+ intact and symmetric  CHEST/LUNG: Clear to percussion bilaterally; No rales, rhonchi, wheezing, or rubs  HEART: Regular rate and rhythm; No murmurs, rubs, or gallops  ABDOMEN: Soft, Nontender, Nondistended; Bowel sounds present  EXTREMITIES:  2+ Peripheral Pulses, No clubbing, cyanosis, or edema, L knee dressing clean and dry.  LYMPH: No lymphadenopathy noted  SKIN: No rashes or lesions        Assessment and Recommendations:  Assessment  Patient is a 57 year old female with a past medical history of GERD, major depression, and cold sore/herpes simplex,  who is s/p left knee total joint replacement POD#0.  Pain management was asked to consult since the patient stated that she takes Suboxone. Patient appears to be quite uncomfortable. She is reporting quite severe left knee pain 9/10.     Patient states that she last took Suboxone about 10 days ago. She rarely takes Suboxone about once every 1 to 2 weeks, if she feels that she needs it. Patient stated that she felt that she had some problems with illicit drug use about 3 years ago, so she made the decision to stop using and detoxed on her own.  Patient states that while she is in the hospital, she would like to have her left knee pain controlled and is open to receiving opioids as needed. Of note, the patient also mentioned that she does not want to be discharged home on opioids.       Plan:  Continue current medication regimen per primary team.    1. Continue oxyCODONE    IR 5 milliGRAM(s) Oral every 3 hours PRN mild pain.  2. Continue oxyCODONE    IR 10 milliGRAM(s) Oral every 3 hours PRN moderate pain.  3. Continue HYDROmorphone   Tablet 4 milliGRAM(s) Oral every 3 hours PRN severe pain.      Thank you for this consult.  Please call pain management with any questions: [phone]
Patient seen and examined . S/p L TKA   , POD # 1. Pain well controlled , no n/v , voiding without difficulty , participating with physical therapy .     CC : L knee chronic pain postop well controlled           MEDICATIONS  (STANDING):  acetaminophen     Tablet .. 975 milliGRAM(s) Oral every 8 hours  acetaminophen   IVPB .. 700 milliGRAM(s) IV Intermittent once  aspirin 325 milliGRAM(s) Oral two times a day  escitalopram 10 milliGRAM(s) Oral daily  ketorolac   Injectable 15 milliGRAM(s) IV Push every 6 hours  multivitamin 1 Tablet(s) Oral daily  pantoprazole    Tablet 40 milliGRAM(s) Oral before breakfast  polyethylene glycol 3350 17 Gram(s) Oral at bedtime  senna 2 Tablet(s) Oral at bedtime  sodium chloride 0.9%. 1000 milliLiter(s) (100 mL/Hr) IV Continuous <Continuous>    MEDICATIONS  (PRN):  HYDROmorphone   Tablet 4 milliGRAM(s) Oral every 3 hours PRN Severe Pain (7 - 10)  magnesium hydroxide Suspension 30 milliLiter(s) Oral daily PRN Constipation  ondansetron Injectable 4 milliGRAM(s) IV Push every 6 hours PRN Nausea and/or Vomiting  oxyCODONE    IR 5 milliGRAM(s) Oral every 3 hours PRN Mild Pain (1 - 3)  oxyCODONE    IR 10 milliGRAM(s) Oral every 3 hours PRN Moderate Pain (4 - 6)  traMADol 50 milliGRAM(s) Oral every 6 hours PRN Moderate Pain (4 - 6)  traMADol 100 milliGRAM(s) Oral every 6 hours PRN Severe Pain (7 - 10)  valACYclovir 500 milliGRAM(s) Oral daily PRN cold sores      LABS:                          8.5    10.09 )-----------( 229      ( 27 Jul 2022 06:13 )             26.0     07-27    139  |  105  |  13.9  ----------------------------<  116<H>  4.0   |  26.0  |  0.39<L>    Ca    8.1<L>      27 Jul 2022 06:13    A1C with Estimated Average Glucose (07.22.22 @ 13:30)    A1C with Estimated Average Glucose Result: 5.8 %    Estimated Average Glucose: 120 mg/dL        RADIOLOGY & ADDITIONAL TESTS:    < from: Xray Knee 1 or 2 Views, Left (07.26.22 @ 14:36) >  ACC: [id number] EXAM:  XR KNEE 1-2 VIEWS LT                          PROCEDURE DATE:  07/26/2022          INTERPRETATION:  Left knee.    Postoperative AP and lateral views show a knee replacement with good   alignment. No bone destruction or fracture.    IMPRESSION: Left knee replacement.    --- End of Report ---            LOR MATUTE MD; Attending Radiologist  This document has been electronically signed. Jul 26 2022  3:02PM    < end of copied text >        REVIEW OF SYSTEMS:    L knee chronic pain postop well controlled, all other systems are reviewed and are negative .  Vital Signs Last 24 Hrs  T(C): 36.7 (27 Jul 2022 08:20), Max: 36.9 (26 Jul 2022 15:15)  T(F): 98 (27 Jul 2022 08:20), Max: 98.4 (26 Jul 2022 15:15)  HR: 78 (27 Jul 2022 09:09) (62 - 80)  BP: 110/63 (27 Jul 2022 09:09) (97/52 - 130/65)  BP(mean): 59 (26 Jul 2022 16:30) (59 - 92)  RR: 18 (27 Jul 2022 08:20) (14 - 18)  SpO2: 96% (27 Jul 2022 08:20) (93% - 99%)    Parameters below as of 27 Jul 2022 08:20  Patient On (Oxygen Delivery Method): room air      PHYSICAL EXAM:    GENERAL: NAD, well-groomed, well-developed  HEAD:  Atraumatic, Normocephalic  EYES: EOMI, PERRLA, conjunctiva and sclera clear  NECK: Supple, No JVD, Normal thyroid  NERVOUS SYSTEM:  Alert & Oriented X3, no focal deficit  CHEST/LUNG: CTA b/l ,  no  rales, rhonchi, wheezing, or rubs  HEART: Regular rate and rhythm; No murmurs, rubs, or gallops  ABDOMEN: Soft, Nontender, Nondistended; Bowel sounds present  EXTREMITIES:  2+ Peripheral Pulses, No clubbing, cyanosis, or edema,   L knee dressing + , clean and dry   LYMPH: No lymphadenopathy noted  SKIN: No rashes or lesions

## 2022-07-27 NOTE — PROGRESS NOTE ADULT - ASSESSMENT
57 year old female with PMH of GERD , Major Depression , cold sore / herpes simplex , c/o  left knee pain for the last 10 months which got progressively got worse over the last few weeks, complains of 10/10 pain, with all activities, she is using a crutches for the last few days to take the pressure of her knees,  she takes Ice and Ibuprofen relies the pain, she had multiple steroid injections and Gel in the past which didn't help much,    1. L knee chronic pain / OA,   S/P L TKA ,   PT/OT/pain mgmt  DVT prophylaxis- as per ortho  Abx as per SCIP- given   Incentive spirometry  Prophylaxis of opioid  induced constipation.      2. GERD - continue PPI    3. Hx of Depression - continue home dose Lexapro    4. DVT prophylaxis  - as per ortho protocol- ASA BID   Opioid induced constipation  prophylaxis - bowel regimen     5. Anemia - acute blood lost anemia - secondary to surgical blood lost - likely on chronic anemia ,   patient states she had colonoscopy 1 yr ago and was normal - advised to follow up with PMD for further w/u   as on outpatient , patient is  asymptomatic.     6. Prediabetes : preop A1C  5.8 , patient is informed , educated about diet , life style changes and need to follow up with   PMD in 3 months to check A1C and follow further recommendations     Dispo plan is Home likely today    Medically stable to d/c once cleared by physical therapy / ortho .

## 2022-08-17 ENCOUNTER — APPOINTMENT (OUTPATIENT)
Dept: ORTHOPEDIC SURGERY | Facility: CLINIC | Age: 57
End: 2022-08-17

## 2022-08-23 ENCOUNTER — APPOINTMENT (OUTPATIENT)
Dept: ORTHOPEDIC SURGERY | Facility: CLINIC | Age: 57
End: 2022-08-23

## 2022-08-23 PROBLEM — F32.9 MAJOR DEPRESSIVE DISORDER, SINGLE EPISODE, UNSPECIFIED: Chronic | Status: ACTIVE | Noted: 2022-07-22

## 2022-08-23 PROBLEM — B00.9 HERPESVIRAL INFECTION, UNSPECIFIED: Chronic | Status: ACTIVE | Noted: 2022-07-22

## 2022-08-29 ENCOUNTER — APPOINTMENT (OUTPATIENT)
Dept: ORTHOPEDIC SURGERY | Facility: CLINIC | Age: 57
End: 2022-08-29

## 2022-08-29 PROCEDURE — 73562 X-RAY EXAM OF KNEE 3: CPT | Mod: 26,LT

## 2022-08-29 PROCEDURE — 99024 POSTOP FOLLOW-UP VISIT: CPT

## 2022-08-29 NOTE — END OF VISIT
[FreeTextEntry3] : I, Joel Chinchilla, acted solely as a scribe for Dr. Suleman Berry on 08/29/2022

## 2022-08-29 NOTE — HISTORY OF PRESENT ILLNESS
[Healed] : healed [Xray (Date:___)] : [unfilled] Xray -  [Doing Well] : is doing well [No Sign of Infection] : is showing no signs of infection [3] : the patient reports pain that is 3/10 in severity [Clean/Dry/Intact] : clean, dry and intact [Swelling] : swollen [Neuro Intact] : an unremarkable neurological exam [Vascular Intact] : ~T peripheral vascular exam normal [Negative Nazario's] : maneuvers demonstrated a negative Nazario's sign [Adequate Pain Control] : has adequate pain control [Chills] : no chills [Constipation] : no constipation [Diarrhea] : no diarrhea [Dysuria] : no dysuria [Fever] : no fever [Nausea] : no nausea [Vomiting] : no vomiting [Erythema] : not erythematous [Discharge] : absent of discharge [Dehiscence] : not dehisced [de-identified] : left total knee Arthroplasty DOS 7/26/22 [de-identified] : 58 y/o F pt is here 4.5 weeks post op from a left TKA. She reports that her pain is lessened, but is having some swelling. We reassured her that this is normal post operation. She is current not taking any pain medication. She is ambulating without a cane today. She is also in PT to improve her motion. She also complains of toes numbness and lower back numbness.  [de-identified] : Left knee exam shows healing incision with no sign of infection. ROM 5-90 [de-identified] : 3V xray of the left knee done in office today and reviewed demonstrates s/p left knee implants in good positioning with no evidence of wear, loosening, or subsidence.  [de-identified] : We recommended that to treat her swelling to continue low impact exercise and icing. We recommend the pt to continue PT via elevated ankle pumping exercise to improve her ROM. Pt should continue taking aspirin BID for DVTP for the next two weeks. She should continue to do low impact exercises. Pt understands the importance of prophylaxis for invasive dental procedures. Regarding her toe and lower back, we recommend seeing a neurologist. F/u with us in 3 weeks.

## 2022-09-16 ENCOUNTER — NON-APPOINTMENT (OUTPATIENT)
Age: 57
End: 2022-09-16

## 2022-09-19 ENCOUNTER — NON-APPOINTMENT (OUTPATIENT)
Age: 57
End: 2022-09-19

## 2022-09-20 ENCOUNTER — NON-APPOINTMENT (OUTPATIENT)
Age: 57
End: 2022-09-20

## 2022-10-12 NOTE — H&P PST ADULT - RESPIRATORY AND THORAX
negative Calcipotriene Pregnancy And Lactation Text: This medication has not been proven safe during pregnancy. It is unknown if this medication is excreted in breast milk.

## 2022-10-17 ENCOUNTER — APPOINTMENT (OUTPATIENT)
Dept: ORTHOPEDIC SURGERY | Facility: CLINIC | Age: 57
End: 2022-10-17

## 2022-10-17 VITALS
WEIGHT: 108 LBS | SYSTOLIC BLOOD PRESSURE: 95 MMHG | DIASTOLIC BLOOD PRESSURE: 56 MMHG | BODY MASS INDEX: 19.14 KG/M2 | TEMPERATURE: 97.9 F | HEART RATE: 86 BPM | HEIGHT: 63 IN
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
## 2022-10-17 PROCEDURE — 99024 POSTOP FOLLOW-UP VISIT: CPT

## 2022-10-17 PROCEDURE — 73562 X-RAY EXAM OF KNEE 3: CPT | Mod: LT

## 2022-10-17 NOTE — HISTORY OF PRESENT ILLNESS
[Pain Location] : pain [Improving] : improving [3] : a current pain level of 3/10 [1] : a minimum pain level of 1/10 [5] : a maximum pain level of 5/10 [Walking] : worsened by walking [Rest] : relieved by rest [de-identified] : 56 y/o F pt is here for f/u visit for left knees. She is s/p left TKA on 7/26/22. patient states was doing well, left knee got to 115 degrees.  patient states on Thursday she developed swelling to lower extremities along with numbness to both toes.  patient denies any worse pain to knee. THe swelling and numbness decreased since then. She will be be doing neurological testing.

## 2022-10-17 NOTE — PHYSICAL EXAM
[LE] : Sensory: Intact in bilateral lower extremities [ALL] : dorsalis pedis, posterior tibial, femoral, popliteal, and radial 2+ and symmetric bilaterally [de-identified] : GENERAL APPEARANCE: Well nourished and hydrated, pleasant, alert, and oriented x 3. Appears their stated age. \par HEENT: Normocephalic, extraocular eye motion intact. Nasal septum midline. Oral cavity clear. External auditory canal clear. \par RESPIRATORY: Breath sounds clear and audible in all lobes. No wheezing, No accessory muscle use.\par CARDIOVASCULAR: No apparent abnormalities. No lower leg edema. No varicosities. Pedal pulses are palpable.\par NEUROLOGIC: Sensation is normal, no muscle weakness in the upper or lower extremities.\par DERMATOLOGIC: No apparent skin lesions, moist, warm, no rash.\par SPINE: Cervical spine appears normal and moves freely; thoracic spine appears normal and moves freely; lumbosacral spine appears normal and moves freely, normal, nontender.\par MUSCULOSKELETAL: Hands, wrists, and elbows are normal and move freely, shoulders are normal and move freely.  [de-identified] : Left knee exam shows healed incision with no sign of infection. ROM 5-110 [de-identified] : 3V xray of the left knee done in office today and reviewed by Dr. Suleman Berry demonstrates s/p left knee implants in good positioning with no evidence of wear, loosening, or subsidence.

## 2022-10-17 NOTE — REVIEW OF SYSTEMS
[Joint Stiffness] : joint stiffness [Joint Pain] : joint pain [Negative] : Heme/Lymph [FreeTextEntry9] : left knee

## 2022-10-17 NOTE — DISCUSSION/SUMMARY
[de-identified] : 56 y/o F pt is post op from left TKA. We reassured her that her implants are stable with no evidence of loosening or wear. She should continue to do low impact exercises. Pt understands the importance of prophylaxis for invasive dental procedures. She will be going to the back doctor very soon and doing neurological testing also to find the exact cause for the numbness. We recommend she does PT. She agreed and we sent a script for PT. She will f/u in 3 months from surgery. \par \par The patient is a 57 year individual with end stage arthritis of their left knee joint. Based upon the patient's continued symptoms and failure to respond to conservative treatment, pt successfully completed left total knee arthroplasty. A long discussion took place with the patient describing what a total joint replacement is and what the procedure would entail. A total knee arthroplasty model, similar to the implant that was used during the operation, was utilized to demonstrate and to discuss the various bearing surfaces of the implants. The hospitalization and post-operative care and rehabilitation were also discussed. The use of perioperative antibiotics and DVT prophylaxis were discussed. The risk, benefits and alternatives to a surgical intervention were discussed at length with the patient. The patient was also advised of risks related to the medical comorbidities, elevated body mass index (BMI), and smoking where applicable. We discussed how to reduce modifiable risk factors and encouraged smoking cessation were applicable. A lengthy discussion took place to review the most common complications including but not limited to: deep vein thrombosis, pulmonary embolus, heart attack, stroke, infection, wound breakdown, numbness, damage to nerves, tendon, muscles, arteries or other blood vessels, death and other possible complications from anesthesia. The patient was told that we will take steps to minimize these risks by using sterile technique, antibiotics and DVT prophylaxis when appropriate and follow the patient postoperatively in the office setting to monitor progress. The possibility of recurrent pain, no improvement in pain and actual worsening of pain were also discussed with the patient.\par The discharge plan of care focused on the patient going home following surgery. The patient was encouraged to make the necessary arrangements to have someone stay with them when they are discharged home. Following discharge, a home care nurse was to the patient. The home care nurse would open the patient’s home care case and request home physical therapy services. Home physical therapy was to commence following discharge provided it was appropriate and covered by the health insurance benefit plan. \par The benefits of surgery were discussed with the patient including the potential for improving her current clinical condition through operative intervention. Alternatives to surgical intervention including continued conservative management were also discussed in detail. All questions were answered to the satisfaction of the patient. The treatment plan of care, as well as a model of a total knee arthroplasty equivalent to the one that will be used for their total joint replacement, was shared with the patient. The patient agreed to the plan of care as well as the use of implants in their total joint replacement.

## 2022-10-17 NOTE — END OF VISIT
[FreeTextEntry3] : I, Joel Chinchilla, acted solely as a scribe for Dr. Suleman Berry on 10/17/2022

## 2022-10-18 RX ORDER — CELECOXIB 200 MG/1
200 CAPSULE ORAL TWICE DAILY
Qty: 60 | Refills: 0 | Status: ACTIVE | COMMUNITY
Start: 2022-10-18 | End: 1900-01-01

## 2023-02-01 ENCOUNTER — APPOINTMENT (OUTPATIENT)
Dept: ORTHOPEDIC SURGERY | Facility: CLINIC | Age: 58
End: 2023-02-01

## 2023-02-03 NOTE — H&P PST ADULT - WILL THE PATIENT ACCEPT THE PFIZER COVID-19 VACCINE IF ELIGIBLE AND IT IS AVAILABLE?
· Secondary to peritonitis  · Noted by leukocytosis, tachycardia tachypnea mild lactic acidosis present on admission  · Sepsis parameters have resolved No

## 2023-04-17 ENCOUNTER — APPOINTMENT (OUTPATIENT)
Dept: ORTHOPEDIC SURGERY | Facility: CLINIC | Age: 58
End: 2023-04-17
Payer: COMMERCIAL

## 2023-04-17 ENCOUNTER — TRANSCRIPTION ENCOUNTER (OUTPATIENT)
Age: 58
End: 2023-04-17

## 2023-04-17 VITALS
WEIGHT: 108 LBS | HEART RATE: 94 BPM | DIASTOLIC BLOOD PRESSURE: 59 MMHG | HEIGHT: 63 IN | BODY MASS INDEX: 19.14 KG/M2 | SYSTOLIC BLOOD PRESSURE: 91 MMHG

## 2023-04-17 PROCEDURE — 20610 DRAIN/INJ JOINT/BURSA W/O US: CPT | Mod: RT

## 2023-04-17 PROCEDURE — 73562 X-RAY EXAM OF KNEE 3: CPT | Mod: 50

## 2023-04-17 PROCEDURE — 99214 OFFICE O/P EST MOD 30 MIN: CPT | Mod: 25

## 2023-04-17 RX ORDER — FLUTICASONE PROPIONATE 50 UG/1
50 SPRAY, METERED NASAL
Qty: 16 | Refills: 0 | Status: ACTIVE | COMMUNITY
Start: 2023-01-31

## 2023-04-17 RX ORDER — ESCITALOPRAM OXALATE 10 MG/1
10 TABLET ORAL
Qty: 45 | Refills: 0 | Status: ACTIVE | COMMUNITY
Start: 2023-03-09

## 2023-04-17 RX ORDER — IBUPROFEN 800 MG/1
800 TABLET, FILM COATED ORAL
Qty: 12 | Refills: 0 | Status: ACTIVE | COMMUNITY
Start: 2023-04-04

## 2023-04-17 RX ORDER — NAPROXEN 250 MG/1
250 TABLET ORAL
Qty: 30 | Refills: 0 | Status: ACTIVE | COMMUNITY
Start: 2023-02-08

## 2023-04-17 RX ORDER — BUPRENORPHINE AND NALOXONE 8; 2 MG/1; MG/1
8-2 FILM BUCCAL; SUBLINGUAL
Qty: 45 | Refills: 0 | Status: ACTIVE | COMMUNITY
Start: 2023-03-09

## 2023-04-17 RX ORDER — VALACYCLOVIR 1 G/1
1 TABLET, FILM COATED ORAL
Qty: 15 | Refills: 0 | Status: ACTIVE | COMMUNITY
Start: 2023-03-16

## 2023-04-17 RX ORDER — AMOXICILLIN 500 MG/1
500 CAPSULE ORAL
Qty: 21 | Refills: 0 | Status: ACTIVE | COMMUNITY
Start: 2023-04-04

## 2023-04-17 NOTE — PROCEDURE
[de-identified] : Patient received  right knee 80mg cortisone injection for osteoarthritis in supine position\par I discussed at length with the patient the planned steroid and lidocaine injection. The risks, benefits, convalescence and alternatives were reviewed. The possible side effects discussed included but were not limited to: pain, swelling, heat, bleeding, and redness. Symptoms are generally mild but if they are extensive then contact the office. Giving pain relievers by mouth such as NSAIDs or Tylenol can generally treat the reactions to steroid and lidocaine. Rare cases of infection have been noted. Rash, hives and itching may occur post injection. If you have muscle pain or cramps, flushing and or swelling of the face, rapid heart beat, nausea, dizziness, fever, chills, headache, difficulty breathing, swelling in the arms or legs, or have a prickly feeling of your skin, contact a health care provider immediately. Following this discussion, the knee was prepped with Alcohol and under sterile condition the 80 mg Depo-Medrol and 6 cc Lidocaine injection was performed with a 20 gauge needle through a superolateral injection site. The needle was introduced into the joint, aspiration was performed to ensure intra-articular placement and the medication was injected. Upon withdrawal of the needle the site was cleaned with alcohol and a band aid applied. The patient tolerated the injection well and there were no adverse effects. Post injection instructions included no strenuous activity for 24 hours, cryotherapy and if there are any adverse effects to contact the office. \par

## 2023-04-17 NOTE — PHYSICAL EXAM
[LE] : Sensory: Intact in bilateral lower extremities [Antalgic] : not antalgic [de-identified] : GENERAL APPEARANCE: Well nourished and hydrated, pleasant, alert, and oriented x 3. Appears their stated age. \par HEENT: Normocephalic, extraocular eye motion intact. Nasal septum midline. Oral cavity clear. External auditory canal clear. \par RESPIRATORY: Breath sounds clear and audible in all lobes. No wheezing, No accessory muscle use.\par CARDIOVASCULAR: No apparent abnormalities. No lower leg edema. No varicosities. Pedal pulses are palpable.\par NEUROLOGIC: Sensation is normal, no muscle weakness in the upper or lower extremities.\par DERMATOLOGIC: No apparent skin lesions, moist, warm, no rash.\par SPINE: Cervical spine appears normal and moves freely; thoracic spine appears normal and moves freely; lumbosacral spine appears normal and moves freely, normal, nontender.\par MUSCULOSKELETAL: Hands, wrists, and elbows are normal and move freely, shoulders are normal and move freely. \par Musculoskeletal\par 5/5 motor strength in bilateral lower extremities. Sensory: Intact in bilateral lower extremities. DTRs: Biceps, brachioradialis, triceps, patellar, ankle and plantar 2+ and symmetric bilaterally. Pulses: dorsalis pedis, posterior tibial, femoral, popliteal, and radial 2+ and symmetric bilaterally. \par Constitutional: Alert and in no acute distress, but well-appearing. \par  [de-identified] : Left knee exam shows healing incision with no sign of infection. ROM 5-130. The surgical incision site(s) swollen, but clean, dry and intact, healed, not erythematous, absent of discharge and not dehisced. Additional findings included an unremarkable neurological exam, peripheral vascular exam normal and maneuvers demonstrated a negative Nazario's sign. \par \par \par The right knee examination shows a palpable osteophyte in the  lateral patella  she has range of motion of 0 to 130 degree medial lateral joint line tenderness [de-identified] : 5V xray of the bilateral knee x-rays are taken today the left knee shows  s/p left knee implants in good positioning with no evidence of wear, loosening, or subsidence.  The right knee\par X-ray shows moderate medial and advanced patellofemoral osteoarthritis with subchondral cysts\par

## 2023-04-17 NOTE — DISCUSSION/SUMMARY
[de-identified] : This is a 58-year-old female status post left total knee arthroplasty 9 months ago with  mild flexion contraction. she continues to have stiffness but she has no limitation with ADL.  Patient's right knee x-ray demonstrated advanced patellofemoral osteoarthritis.  She opted for cortisone injection for temporarily pain relief.  She tolerated the procedure well she would like to pursue physical therapy for strengthening and improving her range of motion which I provided prescription for.  She  may utilize  Tylenol or anti-inflammatory for pain she is a candidate for right knee replacement when she fails to conservative treatments.\par \par \par The patient is a  58 year individual with end stage arthritis of their right knee joint. Based upon the patient's continued symptoms and failure to respond to conservative treatment , including Tylenol and anti-inflammatories, physical therapy, activity modification , local modalities such as apply ice or heat, and injections. I have recommended a total knee arthroplasty for this patient. A long discussion took place with the patient describing what a total joint replacement is and what the procedure would entail. A total knee arthroplasty model, similar to the implant that was used during the operation, was utilized to demonstrate and to discuss the various bearing surfaces of the implants. The hospitalization and post-operative care and rehabilitation were also discussed. The use of perioperative antibiotics and DVT prophylaxis were discussed. The risk, benefits and alternatives to a surgical intervention were discussed at length with the patient. The patient was also advised of risks related to the medical comorbidities, elevated body mass index (BMI), and smoking where applicable. We discussed how to reduce modifiable risk factors and encouraged smoking cessation were applicable.. A lengthy discussion took place to review the most common complications including but not limited to: deep vein thrombosis, pulmonary embolus, heart attack, stroke, infection, wound breakdown, numbness, damage to nerves, tendon, muscles, arteries or other blood vessels, death and other possible complications from anesthesia. The patient was told that we will take steps to minimize these risks by using sterile technique, antibiotics and DVT prophylaxis when appropriate and follow the patient postoperatively in the office setting to monitor progress. The possibility of recurrent pain, no improvement in pain and actual worsening of pain were also discussed with the patient.\par The discharge plan of care focused on the patient going home following surgery. The patient was encouraged to make the necessary arrangements to have someone stay with them when they are discharged home. Following discharge, a home care nurse was to the patient. The home care nurse would open the patient´s home care case and request home physical therapy services. Home physical therapy was to commence following discharge provided it was appropriate and covered by the health insurance benefit plan. \par The benefits of surgery were discussed with the patient including the potential for improving her current clinical condition through operative intervention. Alternatives to surgical intervention including continued conservative management were also discussed in detail. All questions were answered to the satisfaction of the patient. The treatment plan of care, as well as a model of a total knee arthroplasty equivalent to the one that will be used for their total joint replacement, was shared with the patient. The patient agreed to the plan of care as well as the use of implants in their total joint replacement. \par

## 2023-04-17 NOTE — HISTORY OF PRESENT ILLNESS
[Pain Location] : pain [Stable] : stable [5] : a current pain level of 5/10 [de-identified] : pt is s/p left tKA on 7/26/22.  The left knee pain is insignificant but she complains of stiffness she states that she did not start her outpatient physical therapy early enough she may be contributing this stiffness with extension. \par she wants to continue with physical therapy and woking on building strength \par \par She complains of a right knee pain as well as a right ankle pain right knee pain is mostly at the anterior aspect of the knee the pain is the pain is intermittent mild to moderate.  She has appointment with podiatrist for right ankle pain tomorrow.  She is ambulating without walking assistive device she denies fever chills erythema in the knee.\par \par \par

## 2023-06-21 ENCOUNTER — APPOINTMENT (OUTPATIENT)
Dept: ORTHOPEDIC SURGERY | Facility: CLINIC | Age: 58
End: 2023-06-21
Payer: COMMERCIAL

## 2023-06-21 DIAGNOSIS — M17.11 UNILATERAL PRIMARY OSTEOARTHRITIS, RIGHT KNEE: ICD-10-CM

## 2023-06-21 PROCEDURE — 99442: CPT

## 2023-06-21 NOTE — HISTORY OF PRESENT ILLNESS
[de-identified] : I called the patient today she wanted to discuss her left total knee arthroplasty.  This was done now just about a year ago.  She no showed 2 of her postop visits.  She was last seen in April she was noted to have about a 5 degree flexion contracture.  She has outstanding flexion over 130 degrees.  She feels that her extension has worsened since her last visit despite physical therapy.  She notes that her flexion contracture has been progressive.  She is interested in pursuing intervention to try to correct it.  She questions about a manipulation.  I did discuss with her that 1 year postoperatively that a manipulation was likely not to benefit extension and could result in a periprosthetic fracture.  I did discuss the role of revision of the femoral component as well as my apprehension of putting her under an additional operation if not necessary.  I do need to really evaluate the patient with a face-to-face encounter to see if additional surgery would be helpful.

## 2023-06-21 NOTE — DISCUSSION/SUMMARY
[de-identified] : I called the patient today she wanted to discuss her left total knee arthroplasty.  This was done now just about a year ago.  She no showed 2 of her postop visits.  She was last seen in April she was noted to have about a 5 degree flexion contracture.  She has outstanding flexion over 130 degrees.  She feels that her extension has worsened since her last visit despite physical therapy.  She notes that her flexion contracture has been progressive.  She is interested in pursuing intervention to try to correct it.  She questions about a manipulation.  I did discuss with her that 1 year postoperatively that a manipulation was likely not to benefit extension and could result in a periprosthetic fracture.  I did discuss the role of revision of the femoral component as well as my apprehension of putting her under an additional operation if not necessary.  I do need to really evaluate the patient with a face-to-face encounter to see if additional surgery would be helpful.

## 2023-06-30 ENCOUNTER — APPOINTMENT (OUTPATIENT)
Dept: ORTHOPEDIC SURGERY | Facility: CLINIC | Age: 58
End: 2023-06-30
Payer: COMMERCIAL

## 2023-06-30 VITALS
TEMPERATURE: 97.1 F | BODY MASS INDEX: 18.61 KG/M2 | WEIGHT: 105 LBS | HEIGHT: 63 IN | HEART RATE: 93 BPM | DIASTOLIC BLOOD PRESSURE: 74 MMHG | SYSTOLIC BLOOD PRESSURE: 127 MMHG

## 2023-06-30 PROCEDURE — 99214 OFFICE O/P EST MOD 30 MIN: CPT

## 2023-06-30 NOTE — PHYSICAL EXAM
[de-identified] : GENERAL APPEARANCE: Well nourished and hydrated, pleasant, alert, and oriented x 3. Appears their stated age. \par HEENT: Normocephalic, extraocular eye motion intact. Nasal septum midline. Oral cavity clear. External auditory canal clear. \par RESPIRATORY: Breath sounds clear and audible in all lobes. No wheezing, No accessory muscle use.\par CARDIOVASCULAR: No apparent abnormalities. No lower leg edema. No varicosities. Pedal pulses are palpable.\par NEUROLOGIC: Sensation is normal, no muscle weakness in the upper or lower extremities.\par DERMATOLOGIC: No apparent skin lesions, moist, warm, no rash.\par SPINE: Cervical spine appears normal and moves freely; thoracic spine appears normal and moves freely; lumbosacral spine appears normal and moves freely, normal, nontender.\par MUSCULOSKELETAL: Hands, wrists, and elbows are normal and move freely, shoulders are normal and move freely. \par Musculoskeletal\par 5/5 motor strength in bilateral lower extremities. Sensory: Intact in bilateral lower extremities. DTRs: Biceps, brachioradialis, triceps, patellar, ankle and plantar 2+ and symmetric bilaterally. Pulses: dorsalis pedis, posterior tibial, femoral, popliteal, and radial 2+ and symmetric bilaterally. \par Constitutional: Alert and in no acute distress, but well-appearing. \par \par Musculoskeletal: not antalgic . Left knee exam shows healing incision with no sign of infection. ROM 5-130. The surgical incision site(s) swollen, but clean, dry and intact, healed, not erythematous, absent of discharge and not dehisced. Additional findings included an unremarkable neurological exam, peripheral vascular exam normal and maneuvers demonstrated a negative Nazario's sign. \par And also she is concerned about that extension [de-identified] : The right knee examination shows a palpable osteophyte in the lateral patella she has range of motion of 5-7 to 130 degree medial lateral joint line tenderness. \par 5/5 motor strength in bilateral lower extremities. Sensory: Intact in bilateral lower extremities. \par

## 2023-06-30 NOTE — DISCUSSION/SUMMARY
[de-identified] : The patient is a 58-year-old female she is status post left total knee arthroplasty.  She is dissatisfied because of a flexion contracture that is about 7 degrees.  We did discuss femoral revision with elevation of the joint line.  I think that is a bit aggressive.  We also discussed manipulation under anesthesia we did discuss that manipulation under anesthesia may not be effective.  We discussed the role of a Dynasplint.  The patient would like to pursue manipulation under anesthesia with postoperative physical therapy and a Dynasplint.  We will schedule that today

## 2023-06-30 NOTE — HISTORY OF PRESENT ILLNESS
[Pain Location] : pain [Stable] : stable [3] : a current pain level of 3/10 [de-identified] : This is a 58-year-old female status post left total knee arthroplasty on July 26, 2022.  She is in physical therapy 3 times a week to improve her extension she feels her extension is not improving she is bending excellent to 130 degree.\par She had some right knee pain  at the last visit received cortisone injection which she resolved the pain she ambulates without walking assist device.\par

## 2023-07-31 ENCOUNTER — OUTPATIENT (OUTPATIENT)
Dept: OUTPATIENT SERVICES | Facility: HOSPITAL | Age: 58
LOS: 1 days | End: 2023-07-31
Payer: COMMERCIAL

## 2023-07-31 VITALS
DIASTOLIC BLOOD PRESSURE: 70 MMHG | HEART RATE: 86 BPM | SYSTOLIC BLOOD PRESSURE: 110 MMHG | TEMPERATURE: 98 F | WEIGHT: 105.82 LBS | RESPIRATION RATE: 16 BRPM | HEIGHT: 64 IN | OXYGEN SATURATION: 97 %

## 2023-07-31 DIAGNOSIS — Z90.3 ACQUIRED ABSENCE OF STOMACH [PART OF]: Chronic | ICD-10-CM

## 2023-07-31 DIAGNOSIS — M25.562 PAIN IN LEFT KNEE: ICD-10-CM

## 2023-07-31 DIAGNOSIS — Z01.818 ENCOUNTER FOR OTHER PREPROCEDURAL EXAMINATION: ICD-10-CM

## 2023-07-31 DIAGNOSIS — Z98.891 HISTORY OF UTERINE SCAR FROM PREVIOUS SURGERY: Chronic | ICD-10-CM

## 2023-07-31 DIAGNOSIS — Z96.652 PRESENCE OF LEFT ARTIFICIAL KNEE JOINT: Chronic | ICD-10-CM

## 2023-07-31 LAB
ANION GAP SERPL CALC-SCNC: 12 MMOL/L — SIGNIFICANT CHANGE UP (ref 5–17)
BASOPHILS # BLD AUTO: 0.05 K/UL — SIGNIFICANT CHANGE UP (ref 0–0.2)
BASOPHILS NFR BLD AUTO: 0.7 % — SIGNIFICANT CHANGE UP (ref 0–2)
BUN SERPL-MCNC: 11.1 MG/DL — SIGNIFICANT CHANGE UP (ref 8–20)
CALCIUM SERPL-MCNC: 9.2 MG/DL — SIGNIFICANT CHANGE UP (ref 8.4–10.5)
CHLORIDE SERPL-SCNC: 96 MMOL/L — SIGNIFICANT CHANGE UP (ref 96–108)
CO2 SERPL-SCNC: 29 MMOL/L — SIGNIFICANT CHANGE UP (ref 22–29)
CREAT SERPL-MCNC: 0.49 MG/DL — LOW (ref 0.5–1.3)
EGFR: 108 ML/MIN/1.73M2 — SIGNIFICANT CHANGE UP
EOSINOPHIL # BLD AUTO: 0.19 K/UL — SIGNIFICANT CHANGE UP (ref 0–0.5)
EOSINOPHIL NFR BLD AUTO: 2.8 % — SIGNIFICANT CHANGE UP (ref 0–6)
GLUCOSE SERPL-MCNC: 103 MG/DL — HIGH (ref 70–99)
HCT VFR BLD CALC: 33.2 % — LOW (ref 34.5–45)
HGB BLD-MCNC: 11.1 G/DL — LOW (ref 11.5–15.5)
IMM GRANULOCYTES NFR BLD AUTO: 0.3 % — SIGNIFICANT CHANGE UP (ref 0–0.9)
LYMPHOCYTES # BLD AUTO: 2.38 K/UL — SIGNIFICANT CHANGE UP (ref 1–3.3)
LYMPHOCYTES # BLD AUTO: 35.2 % — SIGNIFICANT CHANGE UP (ref 13–44)
MCHC RBC-ENTMCNC: 31.6 PG — SIGNIFICANT CHANGE UP (ref 27–34)
MCHC RBC-ENTMCNC: 33.4 GM/DL — SIGNIFICANT CHANGE UP (ref 32–36)
MCV RBC AUTO: 94.6 FL — SIGNIFICANT CHANGE UP (ref 80–100)
MONOCYTES # BLD AUTO: 0.51 K/UL — SIGNIFICANT CHANGE UP (ref 0–0.9)
MONOCYTES NFR BLD AUTO: 7.5 % — SIGNIFICANT CHANGE UP (ref 2–14)
NEUTROPHILS # BLD AUTO: 3.62 K/UL — SIGNIFICANT CHANGE UP (ref 1.8–7.4)
NEUTROPHILS NFR BLD AUTO: 53.5 % — SIGNIFICANT CHANGE UP (ref 43–77)
PLATELET # BLD AUTO: 269 K/UL — SIGNIFICANT CHANGE UP (ref 150–400)
POTASSIUM SERPL-MCNC: 4.2 MMOL/L — SIGNIFICANT CHANGE UP (ref 3.5–5.3)
POTASSIUM SERPL-SCNC: 4.2 MMOL/L — SIGNIFICANT CHANGE UP (ref 3.5–5.3)
RBC # BLD: 3.51 M/UL — LOW (ref 3.8–5.2)
RBC # FLD: 12.9 % — SIGNIFICANT CHANGE UP (ref 10.3–14.5)
SODIUM SERPL-SCNC: 137 MMOL/L — SIGNIFICANT CHANGE UP (ref 135–145)
WBC # BLD: 6.77 K/UL — SIGNIFICANT CHANGE UP (ref 3.8–10.5)
WBC # FLD AUTO: 6.77 K/UL — SIGNIFICANT CHANGE UP (ref 3.8–10.5)

## 2023-07-31 PROCEDURE — 93010 ELECTROCARDIOGRAM REPORT: CPT

## 2023-07-31 PROCEDURE — G0463: CPT

## 2023-07-31 PROCEDURE — 85025 COMPLETE CBC W/AUTO DIFF WBC: CPT

## 2023-07-31 PROCEDURE — 36415 COLL VENOUS BLD VENIPUNCTURE: CPT

## 2023-07-31 PROCEDURE — 93005 ELECTROCARDIOGRAM TRACING: CPT

## 2023-07-31 PROCEDURE — 80048 BASIC METABOLIC PNL TOTAL CA: CPT

## 2023-07-31 RX ORDER — SODIUM CHLORIDE 9 MG/ML
3 INJECTION INTRAMUSCULAR; INTRAVENOUS; SUBCUTANEOUS ONCE
Refills: 0 | Status: DISCONTINUED | OUTPATIENT
Start: 2023-08-04 | End: 2023-08-18

## 2023-07-31 NOTE — H&P PST ADULT - PROBLEM SELECTOR PLAN 4
Caprini Score 7 High risk,  Surgical team should assess /Strongly recommend pharmacological and mechanical measures for VTE prophylaxis Caprini Score 4 moderate risk surgical team to order appropriate VTE prophylaxis

## 2023-07-31 NOTE — H&P PST ADULT - PROBLEM SELECTOR PLAN 5
Left Knee Total Joint replacement By Dr. Berry on 7/26/22. Covid vaccine series completed, card in chart,(J&J)  covid test was done today. Medical Clearance pending S/p left TKR 8/2022, difficulty with extension, plan for manipulation under anesthesia with Dr Berry on 8/4/23.   Medical evaluation pending

## 2023-07-31 NOTE — H&P PST ADULT - ASSESSMENT
57 year old female with left knee pain for the last 10 months which got progressively got worse over the last few weeks, complains of 10/10 pain, with all activities, she is using a crutches for the last few days to take the pressure of her knees,  she takes Ice and Ibuprofen relies the pain, she had multiple steroid injections and Gel in the past which didn't help much, she is scheduled for a Left Knee Total Joint replacement By Dr. Berry on 22. Covid vaccine series completed, card in chart,(J&J)  covid test was done today. Medical Clearance pending     medications reviewed, instructions given on what medications to take and what not to take., stop meloxicam 7-10 days prior to surgery, she can take Tylenol for pain. ERP teachinng give. she can take her Lexapro in the AM of surgery, All other meds can be continued till the night before surgery.     CAPRINI VTE 2.0 SCORE [CLOT updated 2019]    AGE RELATED RISK FACTORS                                                       MOBILITY RELATED FACTORS  [ x] Age 41-60 years                                            (1 Point)                    [ ] Bed rest                                                        (1 Point)  [ ] Age: 61-74 years                                           (2 Points)                  [ ] Plaster cast                                                   (2 Points)  [ ] Age= 75 years                                              (3 Points)                    [ ] Bed bound for more than 72 hours                 (2 Points)    DISEASE RELATED RISK FACTORS                                               GENDER SPECIFIC FACTORS  [ x] Edema in the lower extremities                       (1 Point)              [ ] Pregnancy                                                     (1 Point)  [ ] Varicose veins                                               (1 Point)                     [ ] Post-partum < 6 weeks                                   (1 Point)             [ ] BMI > 25 Kg/m2                                            (1 Point)                     [ ] Hormonal therapy  or oral contraception          (1 Point)                 [ ] Sepsis (in the previous month)                        (1 Point)               [ ] History of pregnancy complications                 (1 point)  [ ] Pneumonia or serious lung disease                                               [ ] Unexplained or recurrent                     (1 Point)           (in the previous month)                               (1 Point)  [ ] Abnormal pulmonary function test                     (1 Point)                 SURGERY RELATED RISK FACTORS  [ ] Acute myocardial infarction                              (1 Point)               [ ]  Section                                             (1 Point)  [ ] Congestive heart failure (in the previous month)  (1 Point)      [ ] Minor surgery                                                  (1 Point)   [ ] Inflammatory bowel disease                             (1 Point)               [ ] Arthroscopic surgery                                        (2 Points)  [ ] Central venous access                                      (2 Points)                [ ] General surgery lasting more than 45 minutes (2 points)  [ ] Malignancy- Present or previous                   (2 Points)                [x ] Elective arthroplasty                                         (5 points)    [ ] Stroke (in the previous month)                          (5 Points)                                                                                                                                                           HEMATOLOGY RELATED FACTORS                                                 TRAUMA RELATED RISK FACTORS  [ ] Prior episodes of VTE                                     (3 Points)                [ ] Fracture of the hip, pelvis, or leg                       (5 Points)  [ ] Positive family history for VTE                         (3 Points)             [ ] Acute spinal cord injury (in the previous month)  (5 Points)  [ ] Prothrombin 65022 A                                     (3 Points)               [ ] Paralysis  (less than 1 month)                             (5 Points)  [ ] Factor V Leiden                                             (3 Points)                  [ ] Multiple Trauma within 1 month                        (5 Points)  [ ] Lupus anticoagulants                                     (3 Points)                                                           [ ] Anticardiolipin antibodies                               (3 Points)                                                       [ ] High homocysteine in the blood                      (3 Points)                                             [ ] Other congenital or acquired thrombophilia      (3 Points)                                                [ ] Heparin induced thrombocytopenia                  (3 Points)                                     Total Score [   7       ]  OPIOID RISK TOOL    BO EACH BOX THAT APPLIES AND ADD TOTALS AT THE END    FAMILY HISTORY OF SUBSTANCE ABUSE                 FEMALE         MALE                                                Alcohol                             [  ]1 pt          [  ]3pts                                               Illegal Drugs                     [  ]2 pts        [  ]3pts                                               Rx Drugs                           [  ]4 pts        [  ]4 pts    PERSONAL HISTORY OF SUBSTANCE ABUSE                                                                                          Alcohol                             [  ]3 pts       [  ]3 pts                                               Illegal Drugs                     [  ]4 pts        [  ]4 pts                                               Rx Drugs                           [  ]5 pts        [  ]5 pts    AGE BETWEEN 16-45 YEARS                                      [  ]1 pt         [  ]1 pt    HISTORY OF PREADOLESCENT   SEXUAL ABUSE                                                             [  ]3 pts        [  ]0pts    PSYCHOLOGICAL DISEASE                     ADD, OCD, Bipolar, Schizophrenia        [  ]2 pts         [  ]2 pts                      Depression                                               [  ]1 pt           [  ]1 pt           SCORING TOTAL   (add numbers and type here)              ( 0)                                     A score of 3 or lower indicated LOW risk for future opioid abuse  A score of 4 to 7 indicated moderate risk for future opioid abuse  A score of 8 or higher indicates a high risk for opioid abuse   58 year old female pmhx of GERD and OA, s/p left knee replacement 2022, states she had good result at first however she tripped and sustained injury to her left knee.  Patient describes pain as constant, both sharp and dull, 9/10 in severity, relief with rest and ice, celebrex with some relief, worse with activity.  Patient is having difficulty with extension, plan for manipulation under anesthesia with Dr Berry on 23. Patient educated on surgical scrub, preadmission instructions, medical clearance and day of procedure medications, verbalizes understanding. Pt instructed to stop vitamins/supplements/herbal medications/ASA/NSAIDS (celebrex) for one week prior to surgery and discuss with PMD.     CAPRINI SCORE    AGE RELATED RISK FACTORS                                                             [x ] Age 41-60 years                                            (1 Point)  [ ] Age: 61-74 years                                           (2 Points)                 [ ] Age= 75 years                                                (3 Points)             DISEASE RELATED RISK FACTORS                                                       [ x] Edema in the lower extremities                 (1 Point)                     [ ] Varicose veins                                               (1 Point)                                 [ ] BMI > 25 Kg/m2                                            (1 Point)                                  [ ] Serious infection (ie PNA)                            (1 Point)                     [ ] Lung disease ( COPD, Emphysema)            (1 Point)                                                                          [ ] Acute myocardial infarction                         (1 Point)                  [ ] Congestive heart failure (in the previous month)  (1 Point)         [ ] Inflammatory bowel disease                            (1 Point)                  [ ] Central venous access, PICC or Port               (2 points)       (within the last month)                                                                [ ] Stroke (in the previous month)                        (5 Points)    [ ] Previous or present malignancy                       (2 points)                                                                                                                                                         HEMATOLOGY RELATED FACTORS                                                         [ ] Prior episodes of VTE                                     (3 Points)                     [ ] Positive family history for VTE                      (3 Points)                  [ ] Prothrombin 37400 A                                     (3 Points)                     [ ] Factor V Leiden                                                (3 Points)                        [ ] Lupus anticoagulants                                      (3 Points)                                                           [ ] Anticardiolipin antibodies                              (3 Points)                                                       [ ] High homocysteine in the blood                   (3 Points)                                             [ ] Other congenital or acquired thrombophilia      (3 Points)                                                [ ] Heparin induced thrombocytopenia                  (3 Points)                                        MOBILITY RELATED FACTORS  [ ] Bed rest                                                         (1 Point)  [ ] Plaster cast                                                    (2 points)  [ ] Bed bound for more than 72 hours           (2 Points)    GENDER SPECIFIC FACTORS  [ ] Pregnancy or had a baby within the last month   (1 Point)  [ ] Post-partum < 6 weeks                                   (1 Point)  [ ] Hormonal therapy  or oral contraception   (1 Point)  [ ] History of pregnancy complications              (1 point)  [ ] Unexplained or recurrent              (1 Point)    OTHER RISK FACTORS                                           (1 Point)  [x ] BMI >40, smoking, diabetes requiring insulin, chemotherapy  blood transfusions and length of surgery over 2 hours    SURGERY RELATED RISK FACTORS  [ ]  Section within the last month     (1 Point)  [ ] Minor surgery                                                  (1 Point)  [ ] Arthroscopic surgery                                       (2 Points)  [x ] Planned major surgery lasting more            (2 Points)      than 45 minutes     [ ] Elective hip or knee joint replacement       (5 points)       surgery                                                TRAUMA RELATED RISK FACTORS  [ ] Fracture of the hip, pelvis, or leg                       (5 Points)  [ ] Spinal cord injury resulting in paralysis             (5 points)       (in the previous month)    [ ] Paralysis  (less than 1 month)                             (5 Points)  [ ] Multiple Trauma within 1 month                        (5 Points)    Total Score [   5     ]    Caprini Score 0-2: Low Risk, NO VTE prophylaxis required for most patients, encourage ambulation  Caprini Score 3-6: Moderate Risk , pharmacologic VTE prophylaxis is indicated for most patients (in the absence of contraindications)  Caprini Score Greater than or =7: High risk, pharmocologic VTE prophylaxis indicated for most patients (in the absence of contraindications)              OPIOID RISK TOOL    BO EACH BOX THAT APPLIES AND ADD TOTALS AT THE END    FAMILY HISTORY OF SUBSTANCE ABUSE                 FEMALE         MALE                                                Alcohol                             [  ]1 pt          [  ]3pts                                               Illegal Drugs                     [  ]2 pts        [  ]3pts                                               Rx Drugs                           [  ]4 pts        [  ]4 pts    PERSONAL HISTORY OF SUBSTANCE ABUSE                                                                                          Alcohol                             [  ]3 pts       [  ]3 pts                                               Illegal Drugs                     [  ]4 pts        [  ]4 pts                                               Rx Drugs                           [  ]5 pts        [  ]5 pts    AGE BETWEEN 16-45 YEARS                                      [  ]1 pt         [  ]1 pt    HISTORY OF PREADOLESCENT   SEXUAL ABUSE                                                             [  ]3 pts        [  ]0pts    PSYCHOLOGICAL DISEASE                     ADD, OCD, Bipolar, Schizophrenia        [  ]2 pts         [  ]2 pts                      Depression                                               [  ]1 pt           [  ]1 pt           SCORING TOTAL   (add numbers and type here)              ( 0)                                     A score of 3 or lower indicated LOW risk for future opioid abuse  A score of 4 to 7 indicated moderate risk for future opioid abuse  A score of 8 or higher indicates a high risk for opioid abuse

## 2023-07-31 NOTE — H&P PST ADULT - MUSCULOSKELETAL
no calf tenderness/strength 5/5 bilateral upper extremities details… no calf tenderness/strength 5/5 bilateral upper extremities/abnormal gait

## 2023-07-31 NOTE — H&P PST ADULT - MUSCULOSKELETAL COMMENTS
cannot fully extend left leg, cannot fully extend left leg, good ROM with flexion, mild edema left knee

## 2023-07-31 NOTE — H&P PST ADULT - NSICDXPASTMEDICALHX_GEN_ALL_CORE_FT
PAST MEDICAL HISTORY:  GERD (gastroesophageal reflux disease)     Herpes simplex     Major depression     OA (osteoarthritis)     Pain in left knee

## 2023-07-31 NOTE — H&P PST ADULT - HISTORY OF PRESENT ILLNESS
58 year old female pmhx of GERD and OA, s/p left knee replacement 8/2022, states she had good result at first however she tripped and sustained injury to her left knee.  Patient describes pain as constant, both sharp and dull, 9/10 in severity, relief with rest and ice, celebrex with some relief, worse with activity.  Patient is having difficulty with extension, plan for manipulation under anesthesia with Dr Berry on 8/4/23.       58 year old female pmhx of GERD and OA, s/p left knee replacement 8/2022, states she had good result at first however she tripped and sustained injury to her left knee.  Patient describes pain as constant, both sharp and dull, 9/10 in severity, relief with rest and ice, celebrex with some relief, worse with activity.  Patient is having difficulty with extension, plan for manipulation under anesthesia with Dr Berry on 8/4/23.  Medical evaluation pending

## 2023-07-31 NOTE — H&P PST ADULT - NSICDXPASTSURGICALHX_GEN_ALL_CORE_FT
PAST SURGICAL HISTORY:  H/O total knee replacement, left     History of gastrectomy partial    S/P

## 2023-08-03 ENCOUNTER — TRANSCRIPTION ENCOUNTER (OUTPATIENT)
Age: 58
End: 2023-08-03

## 2023-08-04 ENCOUNTER — TRANSCRIPTION ENCOUNTER (OUTPATIENT)
Age: 58
End: 2023-08-04

## 2023-08-04 ENCOUNTER — OUTPATIENT (OUTPATIENT)
Dept: INPATIENT UNIT | Facility: HOSPITAL | Age: 58
LOS: 1 days | End: 2023-08-04
Payer: COMMERCIAL

## 2023-08-04 ENCOUNTER — APPOINTMENT (OUTPATIENT)
Dept: ORTHOPEDIC SURGERY | Facility: HOSPITAL | Age: 58
End: 2023-08-04

## 2023-08-04 VITALS
HEART RATE: 75 BPM | SYSTOLIC BLOOD PRESSURE: 114 MMHG | RESPIRATION RATE: 18 BRPM | OXYGEN SATURATION: 98 % | DIASTOLIC BLOOD PRESSURE: 70 MMHG

## 2023-08-04 VITALS
SYSTOLIC BLOOD PRESSURE: 97 MMHG | OXYGEN SATURATION: 100 % | HEIGHT: 64 IN | RESPIRATION RATE: 20 BRPM | TEMPERATURE: 98 F | WEIGHT: 105.82 LBS | HEART RATE: 72 BPM | DIASTOLIC BLOOD PRESSURE: 57 MMHG

## 2023-08-04 DIAGNOSIS — Z98.891 HISTORY OF UTERINE SCAR FROM PREVIOUS SURGERY: Chronic | ICD-10-CM

## 2023-08-04 DIAGNOSIS — Z90.3 ACQUIRED ABSENCE OF STOMACH [PART OF]: Chronic | ICD-10-CM

## 2023-08-04 DIAGNOSIS — M25.562 PAIN IN LEFT KNEE: ICD-10-CM

## 2023-08-04 DIAGNOSIS — Z96.652 PRESENCE OF LEFT ARTIFICIAL KNEE JOINT: Chronic | ICD-10-CM

## 2023-08-04 PROCEDURE — 73560 X-RAY EXAM OF KNEE 1 OR 2: CPT

## 2023-08-04 PROCEDURE — 73560 X-RAY EXAM OF KNEE 1 OR 2: CPT | Mod: 26,LT

## 2023-08-04 PROCEDURE — 27570 FIXATION OF KNEE JOINT: CPT | Mod: LT

## 2023-08-04 RX ORDER — VALACYCLOVIR 500 MG/1
1 TABLET, FILM COATED ORAL
Qty: 0 | Refills: 0 | DISCHARGE

## 2023-08-04 RX ORDER — APREPITANT 80 MG/1
40 CAPSULE ORAL ONCE
Refills: 0 | Status: COMPLETED | OUTPATIENT
Start: 2023-08-04 | End: 2023-08-04

## 2023-08-04 RX ORDER — CEFAZOLIN SODIUM 1 G
2000 VIAL (EA) INJECTION ONCE
Refills: 0 | Status: DISCONTINUED | OUTPATIENT
Start: 2023-08-04 | End: 2023-08-18

## 2023-08-04 RX ORDER — HYDROCODONE BITARTRATE AND ACETAMINOPHEN 7.5; 325 MG/15ML; MG/15ML
1 SOLUTION ORAL
Qty: 10 | Refills: 0
Start: 2023-08-04

## 2023-08-04 RX ORDER — TRANEXAMIC ACID 100 MG/ML
1000 INJECTION, SOLUTION INTRAVENOUS ONCE
Refills: 0 | Status: DISCONTINUED | OUTPATIENT
Start: 2023-08-04 | End: 2023-08-18

## 2023-08-04 RX ORDER — ESCITALOPRAM OXALATE 10 MG/1
1 TABLET, FILM COATED ORAL
Qty: 0 | Refills: 0 | DISCHARGE

## 2023-08-04 RX ORDER — CELECOXIB 200 MG/1
400 CAPSULE ORAL ONCE
Refills: 0 | Status: COMPLETED | OUTPATIENT
Start: 2023-08-04 | End: 2023-08-04

## 2023-08-04 RX ORDER — OMEPRAZOLE 10 MG/1
1 CAPSULE, DELAYED RELEASE ORAL
Qty: 0 | Refills: 0 | DISCHARGE

## 2023-08-04 RX ORDER — IBUPROFEN 800 MG/1
800 TABLET ORAL 3 TIMES DAILY
Qty: 90 | Refills: 0 | Status: DISCONTINUED | COMMUNITY
Start: 2023-08-04 | End: 2023-08-04

## 2023-08-04 RX ORDER — IBUPROFEN 800 MG/1
800 TABLET, FILM COATED ORAL 3 TIMES DAILY
Qty: 90 | Refills: 0 | Status: ACTIVE | COMMUNITY
Start: 2023-08-04 | End: 1900-01-01

## 2023-08-04 RX ORDER — HYDROMORPHONE HYDROCHLORIDE 2 MG/ML
0.5 INJECTION INTRAMUSCULAR; INTRAVENOUS; SUBCUTANEOUS
Refills: 0 | Status: DISCONTINUED | OUTPATIENT
Start: 2023-08-04 | End: 2023-08-04

## 2023-08-04 RX ORDER — ACETAMINOPHEN 500 MG
975 TABLET ORAL ONCE
Refills: 0 | Status: COMPLETED | OUTPATIENT
Start: 2023-08-04 | End: 2023-08-04

## 2023-08-04 RX ADMIN — Medication 975 MILLIGRAM(S): at 06:50

## 2023-08-04 RX ADMIN — APREPITANT 40 MILLIGRAM(S): 80 CAPSULE ORAL at 06:50

## 2023-08-04 RX ADMIN — HYDROMORPHONE HYDROCHLORIDE 0.5 MILLIGRAM(S): 2 INJECTION INTRAMUSCULAR; INTRAVENOUS; SUBCUTANEOUS at 08:10

## 2023-08-04 RX ADMIN — CELECOXIB 400 MILLIGRAM(S): 200 CAPSULE ORAL at 06:50

## 2023-08-04 RX ADMIN — HYDROMORPHONE HYDROCHLORIDE 0.5 MILLIGRAM(S): 2 INJECTION INTRAMUSCULAR; INTRAVENOUS; SUBCUTANEOUS at 07:55

## 2023-08-04 NOTE — ASU DISCHARGE PLAN (ADULT/PEDIATRIC) - ASU DC SPECIAL INSTRUCTIONSFT
see post-op instruction sheet see post-op instruction sheet - Patient brace to be delivered to home. Cont brace as instructed until Follow-up with Dr. Berry in 2 weeks

## 2023-08-14 ENCOUNTER — APPOINTMENT (OUTPATIENT)
Dept: ORTHOPEDIC SURGERY | Facility: CLINIC | Age: 58
End: 2023-08-14
Payer: COMMERCIAL

## 2023-08-14 VITALS
DIASTOLIC BLOOD PRESSURE: 73 MMHG | SYSTOLIC BLOOD PRESSURE: 120 MMHG | TEMPERATURE: 97.9 F | HEIGHT: 63 IN | WEIGHT: 108 LBS | BODY MASS INDEX: 19.14 KG/M2 | HEART RATE: 95 BPM

## 2023-08-14 PROBLEM — M25.562 PAIN IN LEFT KNEE: Chronic | Status: ACTIVE | Noted: 2023-07-31

## 2023-08-14 PROBLEM — M19.90 UNSPECIFIED OSTEOARTHRITIS, UNSPECIFIED SITE: Chronic | Status: ACTIVE | Noted: 2023-07-31

## 2023-08-14 PROCEDURE — 99024 POSTOP FOLLOW-UP VISIT: CPT

## 2023-08-14 NOTE — PHYSICAL EXAM
[LE] : 5/5 motor strength in bilateral lower extremities [ALL] : dorsalis pedis, posterior tibial, femoral, popliteal, and radial 2+ and symmetric bilaterally [de-identified] : GENERAL APPEARANCE: Well nourished and hydrated, pleasant, alert, and oriented x 3. Appears their stated age.  HEENT: Normocephalic, extraocular eye motion intact. Nasal septum midline. Oral cavity clear. External auditory canal clear.  RESPIRATORY: Breath sounds clear and audible in all lobes. No wheezing, No accessory muscle use. CARDIOVASCULAR: No apparent abnormalities. No lower leg edema. No varicosities. Pedal pulses are palpable. NEUROLOGIC: Sensation is normal, no muscle weakness in the upper or lower extremities. DERMATOLOGIC: No apparent skin lesions, moist, warm, no rash. SPINE: Cervical spine appears normal and moves freely; thoracic spine appears normal and moves freely; lumbosacral spine appears normal and moves freely, normal, nontender. MUSCULOSKELETAL: Hands, wrists, and elbows are normal and move freely, shoulders are normal and move freely.  [de-identified] : Right knee exam shows healed incision with no sign of infection. ROM 5-125

## 2023-08-14 NOTE — END OF VISIT
[FreeTextEntry3] : I, Joel Chinchilla, acted solely as a scribe for Dr. Suleman Berry on 08/14/2023

## 2023-08-14 NOTE — HISTORY OF PRESENT ILLNESS
[de-identified] : 61 y/o F pt is S?P right TARA for consistent flexion contractor.  She is 14 weeks from right TKA. She is s/p right knee TARA. She is making progress but is having a 5-hy-4-degree flexion contractor. She is wearing a Mylene-splint. The pt will continue with PT.  [Improving] : improving [2] : a current pain level of 2/10 [0] : a minimum pain level of 0/10 [4] : a maximum pain level of 4/10

## 2023-08-14 NOTE — DISCUSSION/SUMMARY
[Medication Risks Reviewed] : Medication risks reviewed [de-identified] : 57 y/o F pt is s/p right knee TARA after total knee replacement. We reassured the pt that her implants are stable with no evidence of loosening or wear. She is wearing a Mylene-splint and she should continue. She should continue to do low impact exercises. She should continue PT. Pt understands the importance of prophylaxis for invasive dental procedures. She will f/u in 6 weeks.

## 2023-09-05 ENCOUNTER — APPOINTMENT (OUTPATIENT)
Dept: PHYSICAL MEDICINE AND REHAB | Facility: CLINIC | Age: 58
End: 2023-09-05

## 2023-10-13 ENCOUNTER — APPOINTMENT (OUTPATIENT)
Dept: ORTHOPEDIC SURGERY | Facility: CLINIC | Age: 58
End: 2023-10-13
Payer: COMMERCIAL

## 2023-10-13 ENCOUNTER — APPOINTMENT (OUTPATIENT)
Dept: ORTHOPEDIC SURGERY | Facility: CLINIC | Age: 58
End: 2023-10-13

## 2023-10-13 PROCEDURE — 73562 X-RAY EXAM OF KNEE 3: CPT | Mod: RT

## 2023-10-13 PROCEDURE — 99213 OFFICE O/P EST LOW 20 MIN: CPT

## 2023-11-01 ENCOUNTER — APPOINTMENT (OUTPATIENT)
Dept: PHYSICAL MEDICINE AND REHAB | Facility: CLINIC | Age: 58
End: 2023-11-01
Payer: COMMERCIAL

## 2023-11-01 VITALS
SYSTOLIC BLOOD PRESSURE: 107 MMHG | DIASTOLIC BLOOD PRESSURE: 50 MMHG | RESPIRATION RATE: 14 BRPM | HEIGHT: 63 IN | BODY MASS INDEX: 19.51 KG/M2 | HEART RATE: 92 BPM | WEIGHT: 110.13 LBS

## 2023-11-01 DIAGNOSIS — F17.200 NICOTINE DEPENDENCE, UNSPECIFIED, UNCOMPLICATED: ICD-10-CM

## 2023-11-01 PROCEDURE — 99205 OFFICE O/P NEW HI 60 MIN: CPT

## 2023-11-01 RX ORDER — ESCITALOPRAM OXALATE 5 MG/1
TABLET, FILM COATED ORAL
Refills: 0 | Status: ACTIVE | COMMUNITY

## 2023-11-01 RX ORDER — HYDROXYZINE HYDROCHLORIDE 10 MG/1
10 TABLET ORAL
Qty: 60 | Refills: 0 | Status: DISCONTINUED | COMMUNITY
Start: 2023-03-09 | End: 2023-11-01

## 2023-11-01 RX ORDER — FUROSEMIDE 20 MG/1
20 TABLET ORAL
Qty: 36 | Refills: 0 | Status: DISCONTINUED | COMMUNITY
Start: 2023-04-06 | End: 2023-11-01

## 2023-12-13 ENCOUNTER — APPOINTMENT (OUTPATIENT)
Dept: PHYSICAL MEDICINE AND REHAB | Facility: CLINIC | Age: 58
End: 2023-12-13

## 2023-12-13 NOTE — HISTORY OF PRESENT ILLNESS
[FreeTextEntry1] : Ms. CHARLES DENIS is a 58 year female who presents with low back pain and neck pain  Follows with Ortho (Dr. Berry) s/p L TKA 7/26/22, post-op with left knee flexion contracture, is s/p manipulation under anesthesia    Interval hx 12/13/23 Ms. CHARLES DENIS is a 58 year female who presents for follow up of chronic low back and neck pain. At last visit, patient was started on gabapentin 300mg TID.    HPI 11/1/23 Location: low back  Onset: 10+ years of chronic low back pain, worse over the past 1 year, denies injury or trauma. neck and bilateral upper shoulder pain. bilateral hand and foot tingling.  Provocation/Palliative: any movement or activity.  Quality: sharp, stabbing, knifelike  Radiation: RLE down the lateral thigh to the knee, with numbness and tingling.  Severity:  "12"/10  Denies any associated leg weakness. Denies any loss of bowel/bladder control or any groin numbness.   Current pain medications: celebrex 200mg - by Dr Brery Prednisone - for foot pain   Previous medications trialed: suboxone 8mg-2mg - prior opioid use many years ago. Tapered off.    Previous procedures relevant to complaint: Injections: Surgery: L TKA july 2022    Conservative therapy tried: Physical Therapy: currently for left knee    Diagnostic studies: MR C and L spine with multilevel degenerative changes, no significant central stenosis.

## 2023-12-13 NOTE — PHYSICAL EXAM
[FreeTextEntry1] : Constitutional: In NAD, calm and cooperative Heart: well perfused Lungs: nonlabored breathing MSK (Neck): Inspection: no gross swelling identified Palpation: Tenderness of the bilateral lower cervical paraspinals and upper trapezius  ROM: Pain at end cervical extension and rotation  Strength: 5/5 strength in bilateral upper extremities Reflexes: 2+ Biceps/Brachioradialis/patella (L knee unable to elicit, s/p TKA) /Achilles reflex bilaterally, Hoffmans/Clonus negative bilaterally Sensation: diminished over all 10 fingertips  Special tests: Spurlings test negative bilaterally Facet Loading: mild pos BL  tinel's mild pos left   MSK (Back) Inspection: no gross swelling identified, no scars Palpation: mild Tenderness of the bilateral lower lumbar paraspinals. ++Tender to palpation over right SIJ with reproduction of patient's main pain complaints.  ROM: Pain at end lumbar extension Strength: 5/5 strength in bilateral lower extremities Sensation: diminished over BL feet Special tests: SLR: negative BL RIK: Positive right  FADIR: negative BL  Gaenslen: positive right  Thigh Thrust: positive right  Nata's Finger: positive right Facet loading: negative BL

## 2023-12-13 NOTE — REVIEW OF SYSTEMS
[Fever] : no fever [Chills] : no chills [Chest Pain] : no chest pain [Shortness Of Breath] : no shortness of breath [Incontinence] : no incontinence [FreeTextEntry9] : neck pain, low back pain  [de-identified] : bilateral hand numbness, bilateral foot numbness

## 2023-12-13 NOTE — DATA REVIEWED
[FreeTextEntry1] : MRI-LUMBAR SPINE NON CONTRAST  HISTORY: R20.2 Upper and Lower Extremity Pins and Needles R20.0 Numbness Lower/Upper Extremity M62.81 Muscle weakness M54.42 Lower back pain with left sciatica R26.2 Difficulty Walking  Technique: Multiplanar, multisequence MRI of the lumbar spine was performed without the administration of intravenous contrast .  Findings:  Priors: None  L5-S1 level shows degenerative disc disease without focal disc herniation or central spinal stenosis. Facet arthropathy. Moderate bilateral L5-S1 from stenosis. The L4-5 level shows minimal anterolisthesis of L4 and L5. Degenerative disc disease without focal disc herniation or central spinal stenosis. Mild bilateral L4-5 foraminal stenosis. The L3-4, L2-3, L1-2 levels show degenerative disc disease without focal disc herniation or central spinal stenosis. The neural foramina patent. T12-L1 level shows a right-sided disc herniation indenting the anterior aspect of thecal sac. The neural foramina patent. T11-12 level shows no evidence of disc herniation or spinal stenosis. The neural foramina patent. Conus and cauda equina are normal. Paravertebral soft tissue normal. No fracture-dislocation  IMPRESSION:   Right-sided T12-L1 disc herniation indenting the anterior aspect of thecal sac. Lumbar spondylosis with no other sites of lumbar disc herniation or conus or cauda equina compression. Mild bilateral L4-5 and moderate lateral L5-S1 foraminal stenosis.  Signed by: Jamar Rajput MD Signed Date: 12/9/2022 3:54 PM EST    SIGNED BY: Jamar Rajput M.D., Ext. [phone] 12/09/2022 03:54 PM  ADDENDUM: The impression should read: Mild bilateral L4-5 and moderate bilateral L5-S1 foraminal stenosis.  Signed by: Jamar Rjaput MD Signed Date: 12/9/2022 3:57 PM EST     MRI-CERVICAL SPINE NON CONTRAST  HISTORY: M54.12 Radiculopathy, cervical region   Technique: Multiplanar, multisequence MR imaging of the cervical spine was performed without the administration of intravenous contrast.  Findings:  Comparison: December 8, 2022  Alignment: There is 2-3 mm anterolisthesis at C4-C5.  Bone marrow: No evidence of metastatic disease or acute vertebral body compression fracture.  Craniocervical junction: The craniocervical junction is within normal limits.   At C2-3: No significant spinal canal or neural foraminal stenosis.  At C3-4: No significant spinal canal or neural foraminal stenosis.  At C4-5: There is facet arthropathy and anterolisthesis. There is mild spinal canal stenosis without cord compression. There is moderate right neuroforaminal stenosis. There is no significant change from previous study.  At C5-6: There is disc bulge and shallow central disc protrusion without significant spinal canal or neural foraminal stenosis. No significant change from previous study.  At C6-7: No significant spinal canal or neural foraminal stenosis.  At C7-T1: No significant spinal canal or neural foraminal stenosis.  IMPRESSION:    Stable degenerative change without high-grade spinal canal stenosis/cord compression.  2 to 3 mm anterolisthesis at C4-C5 with moderate neuroforaminal stenosis on the right.    Signed by: Marjorie Jackson MD Signed Date: 3/24/2023 3:46 PM EDT    SIGNED BY: Marjorie Jackson M.D., Ext. [phone] 03/24/2023 03:46 PM

## 2023-12-13 NOTE — ASSESSMENT
[FreeTextEntry1] : Ms. CHARLES DENIS is a 58 year female who presents with chronic low back and neck pain, bilateral hand and foot numbness.  Overall patient states that her right-sided low back pain is her primary pain complaint today.  On examination she has no focal neurologic deficits, negative straight leg raise, negative facet loading.  She has multiple positive right SI joint maneuvers with reproduction of her pain.  Discussed with patient that the etiology of her low back pain is most likely due to right SI joint dysfunction.  On cervical spine examination patient has no focal neurologic deficits, negative Spurling's and negative facet loading.  She has positive tenderness to palpation over taut bands of muscle at the bilateral lower cervical paraspinals and upper trapezius muscles.  Discussed with her that the likely etiology of her neck pain is due to myofascial pain.  Patient has generalized diminished sensation and numbness and tingling of her bilateral fingers and toes.  She states that she has had nerve conduction studies done but did not bring in the results today.  Discussed possible causes of diffuse peripheral neuropathy however informed patient that etiology is uncertain but does not appear to be due to spinal issues.  Patient is currently undergoing physical therapy for her left knee flexion contracture and taking oral anti-inflammatories.  At this time she is amenable to conservative management with physical therapy for her low back and sacroiliac joint to work on lumbar and gluteal stretching and core stabilization as well as a trial of gabapentin for her peripheral neuropathy and chronic neck and low back pain.  She will work on obtaining her nerve conduction study results and bring them at next visit.  She also has an appointment coming up with podiatry to discuss her feet symptoms.    Impression: Chronic low back pain with right-sided sciatica Right sacroiliac joint dysfunction Chronic neck pain Cervical myofascial pain Bilateral hand numbness Bilateral foot numbness Peripheral neuropathy  Plan:  - Ortho notes reviewed - MRI C/L spine reviewed -  reviewed.  Patient was recently weaned off of Suboxone, had been taking for prior opioid use disorder - Start gabapentin 300mg qhs x3 days, then take 300mg BID x3 days, then 300mg TID until follow up. - PT referral provided, emphasized on compliance to HEP - Instructed patient to bring in results of nerve conduction study at next visit -If pain does not improve or worsen can consider further up titration of gabapentin as tolerated vs. right sacroiliac joint steroid injection under fluoroscopy.  - For her neck pain encourage patient to perform neck and shoulder stretches - rtc in 8 weeks    The patient expressed verbal understanding and is in agreement with the plan of care. All of the patient's questions and concerns were addressed during today's visit.    I spent a total of  70  minutes on this encounter, including documentation, face to face time, care coordination and reviewing prior records

## 2024-01-29 ENCOUNTER — NON-APPOINTMENT (OUTPATIENT)
Age: 59
End: 2024-01-29

## 2024-01-30 ENCOUNTER — APPOINTMENT (OUTPATIENT)
Dept: PHYSICAL MEDICINE AND REHAB | Facility: CLINIC | Age: 59
End: 2024-01-30
Payer: COMMERCIAL

## 2024-01-30 VITALS
RESPIRATION RATE: 14 BRPM | DIASTOLIC BLOOD PRESSURE: 54 MMHG | BODY MASS INDEX: 21.09 KG/M2 | HEART RATE: 101 BPM | WEIGHT: 119 LBS | SYSTOLIC BLOOD PRESSURE: 100 MMHG | HEIGHT: 63 IN

## 2024-01-30 DIAGNOSIS — M79.18 MYALGIA, OTHER SITE: ICD-10-CM

## 2024-01-30 DIAGNOSIS — M54.2 CERVICALGIA: ICD-10-CM

## 2024-01-30 PROCEDURE — 99215 OFFICE O/P EST HI 40 MIN: CPT

## 2024-01-30 RX ORDER — DICLOFENAC SODIUM 1% 10 MG/G
1 GEL TOPICAL TWICE DAILY
Qty: 3 | Refills: 2 | Status: ACTIVE | COMMUNITY
Start: 2024-01-30 | End: 1900-01-01

## 2024-01-30 NOTE — ASSESSMENT
[FreeTextEntry1] : Ms. CHARLES DENIS is a 58 year female who presents with chronic low back and neck pain, bilateral hand and foot numbness.  Overall patient states that her right-sided low back pain is her primary pain complaint today.  On examination she has no focal neurologic deficits, negative straight leg raise, negative facet loading.  She has multiple positive right SI joint maneuvers with reproduction of her pain.  Discussed with patient that the etiology of her low back pain is most likely due to right SI joint dysfunction. Pain has worsened despite PO NSAIDS and PT, and is limiting her ADLs, toileting, lower body dressing. She is unable to drive long distances, stand or walk for a long time, which she has to do for work.  Pain keeps her up at night and she does not sleep well.   On cervical spine examination patient has no focal neurologic deficits, negative Spurling's and negative facet loading.  She has positive tenderness to palpation over taut bands of muscle at the bilateral lower cervical paraspinals and upper trapezius muscles.  Discussed with her that the likely etiology of her neck pain is due to myofascial pain.  Patient has generalized diminished sensation and numbness and tingling of her bilateral fingers and toes.  She states that she has had nerve conduction studies done but did not bring in the results today.  Discussed possible causes of diffuse peripheral neuropathy however informed patient that etiology is uncertain but does not appear to be due to spinal issues.  She will work on obtaining her nerve conduction study results and bring them at next visit.      Impression: Chronic low back pain with right-sided sciatica Right sacroiliac joint dysfunction Chronic neck pain Cervical myofascial pain Bilateral hand numbness Bilateral foot numbness Peripheral neuropathy  Plan:  - CT A/P reviewed. Encouraged patient to follow up with her GI and oncology specialists at [facility]. Provided emotional support.  - i-stop reviewed Reference #: [id number]. Patient was recently weaned off of Suboxone, had been taking for prior opioid use disorder - increase gabapentin from 300mg TID to 300/300/600mg x3 days, then 600/300/600mg x3days, then 600mg TID until follow up. new rx sent.  - Start diclofenac gel to affected area 3-4x/day PRN, rx sent. Advised not to take concurrently with PO NSAIDS - continue PT and HEP - Instructed patient to bring in results of nerve conduction study at next visit - Will order R SIJ injection under fluoroscopy x1 as pain has worsened despite conservative measures.  - For her neck pain encourage patient to perform neck and shoulder stretches, can consider TPI in the future if pain worsened.  - referral to podiatry provided per patient request.  - rtc after procedure   The patient expressed verbal understanding and is in agreement with the plan of care. All of the patient's questions and concerns were addressed during today's visit.    I spent a total of  45  minutes on this encounter, including documentation, face to face time, care coordination and reviewing prior records

## 2024-01-30 NOTE — HISTORY OF PRESENT ILLNESS
[FreeTextEntry1] : Ms. CHARLES DENIS is a 58 year female who presents with low back pain and neck pain  Follows with Ortho (Dr. Berry) s/p L TKA 7/26/22, post-op with left knee flexion contracture, is s/p manipulation under anesthesia  [place] [facility] [place]     interval 01/30/2024 Ms. 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 At last visit, trial of gabapentin was started and PT was continued. She denies any improvement of her neuropathic pain with gabapentin. Neck pain is persistent, worse when she is stressed and tense. She still has pain in her right low back/buttock, describes it as "knife-like", rated 13/10, constant, worse with driving, bending, turning her right leg to put on her pants, socks and shoes. Pain has not improved with celecoxib. She continues to have constant numbness and tingling in her BL feet, has not established care with a new podiatrist yet.   Of note, she was at [facility] on 12/13/23 due to vomiting, had a CT A/P done was told she had a "spot" on her liver and abdominal findings that were not there post-op 5 years ago. She has not had any further vomiting episodes since. She has a follow up [place] [facility] [place]       HPI 11/1/23 Location: low back  Onset: 10+ years of chronic low back pain, worse over the past 1 year, denies injury or trauma. neck and bilateral upper shoulder pain. bilateral hand and foot tingling.  Provocation/Palliative: any movement or activity.  Quality: sharp, stabbing, knifelike  Radiation: RLE down the lateral thigh to the knee, with numbness and tingling.  Severity:  "12"/10  Denies any associated leg weakness. Denies any loss of bowel/bladder control or any groin numbness.   Current pain medications: celebrex 200mg - by Dr Berry Prednisone - for foot pain   Previous medications trialed: suboxone 8mg-2mg - prior opioid use many years ago. Tapered off.    Previous procedures relevant to complaint: Injections: - none  Surgery: L TKA july 2022    Conservative therapy tried: Physical Therapy: currently for left knee    Diagnostic studies: MR C and L spine with multilevel degenerative changes, no significant central stenosis.

## 2024-01-30 NOTE — DATA REVIEWED
[MRI] : MRI [FreeTextEntry1] : MRI-LUMBAR SPINE NON CONTRAST  HISTORY: R20.2 Upper and Lower Extremity Pins and Needles R20.0 Numbness Lower/Upper Extremity M62.81 Muscle weakness M54.42 Lower back pain with left sciatica R26.2 Difficulty Walking  Technique: Multiplanar, multisequence MRI of the lumbar spine was performed without the administration of intravenous contrast .  Findings:  Priors: None  L5-S1 level shows degenerative disc disease without focal disc herniation or central spinal stenosis. Facet arthropathy. Moderate bilateral L5-S1 from stenosis. The L4-5 level shows minimal anterolisthesis of L4 and L5. Degenerative disc disease without focal disc herniation or central spinal stenosis. Mild bilateral L4-5 foraminal stenosis. The L3-4, L2-3, L1-2 levels show degenerative disc disease without focal disc herniation or central spinal stenosis. The neural foramina patent. T12-L1 level shows a right-sided disc herniation indenting the anterior aspect of thecal sac. The neural foramina patent. T11-12 level shows no evidence of disc herniation or spinal stenosis. The neural foramina patent. Conus and cauda equina are normal. Paravertebral soft tissue normal. No fracture-dislocation  IMPRESSION:   Right-sided T12-L1 disc herniation indenting the anterior aspect of thecal sac. Lumbar spondylosis with no other sites of lumbar disc herniation or conus or cauda equina compression. Mild bilateral L4-5 and moderate lateral L5-S1 foraminal stenosis.  Signed by: Jamar Rajput MD Signed Date: 12/9/2022 3:54 PM EST    SIGNED BY: Jamar Rajput M.D., Ext. [phone] 12/09/2022 03:54 PM  ADDENDUM: The impression should read: Mild bilateral L4-5 and moderate bilateral L5-S1 foraminal stenosis.  Signed by: Jamar Rajput MD Signed Date: 12/9/2022 3:57 PM EST     MRI-CERVICAL SPINE NON CONTRAST  HISTORY: M54.12 Radiculopathy, cervical region   Technique: Multiplanar, multisequence MR imaging of the cervical spine was performed without the administration of intravenous contrast.  Findings:  Comparison: December 8, 2022  Alignment: There is 2-3 mm anterolisthesis at C4-C5.  Bone marrow: No evidence of metastatic disease or acute vertebral body compression fracture.  Craniocervical junction: The craniocervical junction is within normal limits.   At C2-3: No significant spinal canal or neural foraminal stenosis.  At C3-4: No significant spinal canal or neural foraminal stenosis.  At C4-5: There is facet arthropathy and anterolisthesis. There is mild spinal canal stenosis without cord compression. There is moderate right neuroforaminal stenosis. There is no significant change from previous study.  At C5-6: There is disc bulge and shallow central disc protrusion without significant spinal canal or neural foraminal stenosis. No significant change from previous study.  At C6-7: No significant spinal canal or neural foraminal stenosis.  At C7-T1: No significant spinal canal or neural foraminal stenosis.  IMPRESSION:    Stable degenerative change without high-grade spinal canal stenosis/cord compression.  2 to 3 mm anterolisthesis at C4-C5 with moderate neuroforaminal stenosis on the right.    Signed by: Marjorie Jackson MD Signed Date: 3/24/2023 3:46 PM EDT    SIGNED BY: Marjorie Jackson M.D., Ext. [phone] 03/24/2023 03:46 PM

## 2024-01-30 NOTE — REVIEW OF SYSTEMS
[Fever] : no fever [Chills] : no chills [Chest Pain] : no chest pain [Shortness Of Breath] : no shortness of breath [Incontinence] : no incontinence [FreeTextEntry9] : neck pain, low back pain  [de-identified] : bilateral hand numbness, bilateral foot numbness

## 2024-01-30 NOTE — PHYSICAL EXAM
[FreeTextEntry1] : Constitutional: In NAD, calm and cooperative Heart: well perfused Lungs: nonlabored breathing MSK (Neck): Inspection: no gross swelling identified Palpation: Tenderness of the bilateral lower cervical paraspinals and upper trapezius  ROM: Pain at end cervical extension and rotation  Strength: 5/5 strength in bilateral upper extremities Reflexes: 2+ Biceps/Brachioradialis/patella (L knee unable to elicit, s/p TKA) /Achilles reflex bilaterally, Hoffmans/Clonus negative bilaterally Sensation: diminished over all 10 fingertips  Special tests: Spurlings test negative bilaterally Facet Loading: mild pos BL  tinel's mild pos left   MSK (Back) Inspection: no gross swelling identified, no scars Palpation: mild Tenderness of the bilateral lower lumbar paraspinals. ++Tender to palpation over right SIJ with reproduction of patient's main pain complaints.  ROM: Pain at end lumbar extension, pain with hip external rotation  Strength: 5/5 strength in bilateral lower extremities Sensation: diminished over BL feet Special tests: SLR: negative BL RIK: Positive right  FADIR: negative BL  Gaenslen: positive right  Thigh Thrust: positive right  Nata's Finger: positive right Facet loading: negative BL

## 2024-02-07 ENCOUNTER — NON-APPOINTMENT (OUTPATIENT)
Age: 59
End: 2024-02-07

## 2024-02-08 ENCOUNTER — APPOINTMENT (OUTPATIENT)
Dept: PHYSICAL MEDICINE AND REHAB | Facility: AMBULATORY SURGERY CENTER | Age: 59
End: 2024-02-08

## 2024-02-15 ENCOUNTER — APPOINTMENT (OUTPATIENT)
Dept: PHYSICAL MEDICINE AND REHAB | Facility: GI CENTER | Age: 59
End: 2024-02-15

## 2024-02-16 ENCOUNTER — APPOINTMENT (OUTPATIENT)
Dept: PHYSICAL MEDICINE AND REHAB | Facility: GI CENTER | Age: 59
End: 2024-02-16
Payer: COMMERCIAL

## 2024-02-28 ENCOUNTER — TRANSCRIPTION ENCOUNTER (OUTPATIENT)
Age: 59
End: 2024-02-28

## 2024-02-28 NOTE — PROCEDURE
[de-identified] : Sacroiliac Joint Injection   Attending: Araceli Randall DO   Pre-operative diagnosis: Sacroiliac Joint dysfunction   Post-operative diagnosis: Sacroiliac Joint dysfunction   Procedure: right sacroiliac joint injection under fluoroscopic guidance   SEDATION: As per Anesthesia   ESTIMATED BLOOD LOSS: None   SIDE: bilateral    DESCRIPTION: Patient was greeted in the pre-procedure holding area. The risks, benefits and alternatives to the procedure were again reviewed with the patient and written informed consent was placed in the chart. Patient was taken to the procedure room and positioned prone on the fluoroscopic table. Prior to the procedure, a time out was completed, verifying correct patient, procedure and site.   The skin was prepped with chlorhexidine and draped in usual sterile fashion. A  film was taken to identify the right SI joint and the inferior most intersection of the anterior and posterior sacroiliac joint lines. The overlying skin and subcutaneous tissue were anesthetized using a 27-gauge and 1-1/2 inch needle with 1% preservative-free lidocaine for a total volume of 3ml. A 22-gauge 3.5" Quincke spinal needle was advanced into the right sacroiliac joint space under intermittent fluoroscopic guidance. Needle position was confirmed on both AP and lateral views   Then, after negative aspiration, 1mL of contrast was injected, which confirmed adequate intra-articular spread. There was no evidence of intravascular uptake. At this point, after negative aspiration, a total of 40mg depomedrol and 1mL of lidocaine 1% was injected easily into the joint. The needle was then flushed with 1% lidocaine and removed. The needle insertion site was dressed appropriately.     Disposition: Patient was taken to the recovery room where they were monitored for a brief period of time. The patient tolerated the procedure well and was discharged home in stable condition with post procedural instructions. Patient was instructed to follow up in 2 weeks.

## 2024-02-29 ENCOUNTER — OUTPATIENT (OUTPATIENT)
Dept: OUTPATIENT SERVICES | Facility: HOSPITAL | Age: 59
LOS: 1 days | End: 2024-02-29
Payer: COMMERCIAL

## 2024-02-29 ENCOUNTER — APPOINTMENT (OUTPATIENT)
Dept: PHYSICAL MEDICINE AND REHAB | Facility: GI CENTER | Age: 59
End: 2024-02-29
Payer: COMMERCIAL

## 2024-02-29 ENCOUNTER — NON-APPOINTMENT (OUTPATIENT)
Age: 59
End: 2024-02-29

## 2024-02-29 DIAGNOSIS — Z96.652 PRESENCE OF LEFT ARTIFICIAL KNEE JOINT: Chronic | ICD-10-CM

## 2024-02-29 DIAGNOSIS — M53.3 SACROCOCCYGEAL DISORDERS, NOT ELSEWHERE CLASSIFIED: ICD-10-CM

## 2024-02-29 DIAGNOSIS — Z90.3 ACQUIRED ABSENCE OF STOMACH [PART OF]: Chronic | ICD-10-CM

## 2024-02-29 DIAGNOSIS — Z98.891 HISTORY OF UTERINE SCAR FROM PREVIOUS SURGERY: Chronic | ICD-10-CM

## 2024-02-29 DIAGNOSIS — M54.41 LUMBAGO WITH SCIATICA, RIGHT SIDE: ICD-10-CM

## 2024-02-29 PROCEDURE — 76000 FLUOROSCOPY <1 HR PHYS/QHP: CPT

## 2024-02-29 PROCEDURE — 27096 INJECT SACROILIAC JOINT: CPT | Mod: RT

## 2024-02-29 PROCEDURE — G0260: CPT | Mod: RT

## 2024-02-29 NOTE — PROCEDURE
[de-identified] : Sacroiliac Joint Injection   Attending: Araceli Randall DO   Pre-operative diagnosis: Sacroiliac Joint dysfunction   Post-operative diagnosis: Sacroiliac Joint dysfunction   Procedure: right sacroiliac joint injection under fluoroscopic guidance   SEDATION: As per Anesthesia   ESTIMATED BLOOD LOSS: None   SIDE: right   DESCRIPTION: Patient was greeted in the pre-procedure holding area. The risks, benefits and alternatives to the procedure were again reviewed with the patient and written informed consent was placed in the chart. Patient was taken to the procedure room and positioned prone on the fluoroscopic table. Prior to the procedure, a time out was completed, verifying correct patient, procedure and site.   The skin was prepped with chlorhexidine and draped in usual sterile fashion. A  film was taken to identify the right SI joint and the inferior most intersection of the anterior and posterior sacroiliac joint lines. The overlying skin and subcutaneous tissue were anesthetized using a 25-gauge and 1-1/2 inch needle with 1% preservative-free lidocaine for a total volume of 3ml. A 22-gauge 3.5" Quincke spinal needle was advanced into the right sacroiliac joint space under intermittent fluoroscopic guidance. Needle position was confirmed on both AP and lateral views   Then, after negative aspiration, 1mL of contrast was injected, which confirmed adequate intra-articular spread. There was no evidence of intravascular uptake. At this point, after negative aspiration, a total of 40mg depomedrol and 1mL of lidocaine 1% was injected easily into the joint. The needle was then flushed with 1% lidocaine and removed. The needle insertion site was dressed appropriately.     Disposition: Patient was taken to the recovery room where they were monitored for a brief period of time. The patient tolerated the procedure well and was discharged home in stable condition with post procedural instructions. Patient was instructed to follow up in 2 weeks.

## 2024-04-16 ENCOUNTER — NON-APPOINTMENT (OUTPATIENT)
Age: 59
End: 2024-04-16

## 2024-04-17 ENCOUNTER — APPOINTMENT (OUTPATIENT)
Dept: PHYSICAL MEDICINE AND REHAB | Facility: CLINIC | Age: 59
End: 2024-04-17
Payer: COMMERCIAL

## 2024-04-17 VITALS — DIASTOLIC BLOOD PRESSURE: 74 MMHG | SYSTOLIC BLOOD PRESSURE: 124 MMHG | HEART RATE: 98 BPM | HEIGHT: 63 IN

## 2024-04-17 DIAGNOSIS — R20.0 ANESTHESIA OF SKIN: ICD-10-CM

## 2024-04-17 DIAGNOSIS — M24.562 CONTRACTURE, LEFT KNEE: ICD-10-CM

## 2024-04-17 PROCEDURE — 99215 OFFICE O/P EST HI 40 MIN: CPT

## 2024-04-17 RX ORDER — CELECOXIB 200 MG/1
200 CAPSULE ORAL TWICE DAILY
Qty: 30 | Refills: 0 | Status: ACTIVE | COMMUNITY
Start: 2022-07-20 | End: 1900-01-01

## 2024-04-17 RX ORDER — GABAPENTIN 300 MG/1
300 CAPSULE ORAL
Qty: 180 | Refills: 1 | Status: DISCONTINUED | COMMUNITY
Start: 2023-11-01 | End: 2024-04-17

## 2024-04-17 NOTE — PHYSICAL EXAM
[FreeTextEntry1] : Constitutional: In NAD, calm and cooperative Heart: well perfused Lungs: nonlabored breathing MSK (Neck): Inspection:  All 10 fingers with red and white discoloration starting at PIP to fingertips  Palpation: Tenderness of the bilateral lower cervical paraspinals and upper trapezius,  ROM: Pain at end cervical extension and rotation.  Strength: 5/5 strength in bilateral upper extremities Reflexes: 2+ Biceps/Brachioradialis/patella (L knee unable to elicit, s/p TKA) /Achilles reflex bilaterally, Hoffmans/Clonus negative bilaterally Sensation: diminished over all 10 fingertips  Special tests: Spurlings test negative bilaterally Facet Loading: mild pos BL  tinel's mild pos left   MSK (Back) Inspection: mild swelling around left knee, no erythema, surgical incision site well healed, skin approximated. Palpation: mild Tenderness of the bilateral lower lumbar paraspinals. mild TTP of right SIJ, improved since prior.   ROM: Pain at end lumbar extension, pain with hip external rotation  Strength: 5/5 strength in bilateral lower extremities Sensation: diminished over BL feet Special tests: SLR: negative BL RIK: Positive right, improved since prior, localized pain to back and buttock, no longer with pain down right lateral thigh  FADIR: negative BL  Gaenslen: positive right localized pain to back and buttock, no longer with pain down right lateral thigh  Thigh Thrust: positive right  localized pain to back and buttock, no longer with pain down right lateral thigh  Nata's Finger: positive right localized pain to back and buttock, no longer with pain down right lateral thigh  Facet loading: negative BL

## 2024-04-17 NOTE — ASSESSMENT
[FreeTextEntry1] : Ms. CHARLES DENIS is a 58 year female who presents with chronic low back and neck pain, bilateral hand and foot numbness.  Overall patient states that her right-sided low back pain is her primary pain complaint today.  On examination she has no focal neurologic deficits, negative straight leg raise, negative facet loading.  Initially, patient stated no improvement of low back/buttock pain since last visit. Upon further discussion, determined that the pain, numbness and tingling that was radiating from her right buttock down her right posterior-lateral thigh has resolved. Additionally, she is now able to cross her right ankle over her left knee to put on her socks, which she was unable to perform prior. On examination, intensity of pain to palpation over the right SIJ has reduced, and she is better able to tolerate SIJ provocative maneuvers, though she still has localized pain in her right buttock with these movements. Reviewed with the patient that it appears that she had some improvement with her recent injection with RLE symptoms and RLE ROM. Discussed realistic expectations to have from these injections.   Previously, on cervical spine examination patient has no focal neurologic deficits, negative Spurling's and negative facet loading.  She has positive tenderness to palpation over taut bands of muscle at the bilateral lower cervical paraspinals and upper trapezius muscles.  Discussed with her that the likely etiology of her neck pain is due to myofascial pain.  Patient has generalized diminished sensation and numbness and tingling of her bilateral fingers and toes.  She states that she has had nerve conduction studies done but did not bring in the results today.  Discussed possible causes of diffuse peripheral neuropathy however informed patient that etiology is uncertain but does not appear to be due to spinal issues.  She will work on obtaining her nerve conduction study results and bring them at next visit.  Of note, her fingers and toes become red and tingly, especially with cold exposure, has not had eval by rheum.     Impression: Chronic low back pain with right-sided sciatica Right sacroiliac joint dysfunction Chronic neck pain Cervical myofascial pain Bilateral hand numbness Bilateral foot numbness Peripheral neuropathy  Plan:  - CT A/P reviewed. Encouraged patient to follow up with her GI and oncology specialists at [facility] [place]. Has follow up on 4/23/34.  - f/u with Dr Berry (ortho) for L knee pain  - f/u with podiatry, agree with referral to orthotics  - referral to rheumatology for eval of red/cold fingers and toes. ?raynaud's  - i-stop reviewed - dc gabapentin, not helpful. patient self dc'd after last visit.  - continue celebrex 200mg qd prn, take with food. rx sent  - continue diclofenac gel to affected area 3-4x/day PRN, rx sent. Advised not to take concurrently with PO NSAIDS - PT referral for LBP/SIJ provided.  - Instructed patient to bring in results of nerve conduction study at next visit - For her neck pain encourage patient to perform neck and shoulder stretches, can consider TPI in the future if pain worsened.  - If pain does not improve, will consider repeat R SIJ injection at the end of May 2024.  - RTC in 4-6 weeks  The patient expressed verbal understanding and is in agreement with the plan of care. All of the patient's questions and concerns were addressed during today's visit.    I spent a total of  49  minutes on this encounter, including documentation, face to face time, care coordination and reviewing prior records

## 2024-04-17 NOTE — REVIEW OF SYSTEMS
[Fever] : no fever [Chills] : no chills [Chest Pain] : no chest pain [Shortness Of Breath] : no shortness of breath [Incontinence] : no incontinence [FreeTextEntry9] : neck pain, low back pain  [de-identified] : bilateral hand numbness, bilateral foot numbness

## 2024-04-17 NOTE — HISTORY OF PRESENT ILLNESS
[FreeTextEntry1] : Ms. CHARLES DENIS is a 58 year female who presents with low back pain and neck pain  Follows with Ortho (Dr. Berry) s/p L TKA 7/26/22, post-op with left knee flexion contracture, is s/p manipulation under anesthesia  hx of GIST s/p resection 2019 at [facility]   interval 04/17/2024  Ms. 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 She underwent right SIJ injection under fluoroscopy on 2/29/24. Since last visit, she states that she has not received any improvement in her pain. She states that her pain is still constant in the mid low back. However, upon further discussion, she notes resolution of RLE radiation and tingling and improved RLE ROM. She also states that her left knee pain is flared up with mild swelling. No redness, no fevers, no chills, no SOB.  She has continued PT with improvement in her L knee ROM, but still is unable to fully extend her knee.  She has followed up with podiatry, was given a referral for orthotics, which she did not obtain yet. Notes that her fingers and toes can turn red and white and cold in the mornings.  Denies any associated leg weakness. Denies any loss of bowel/bladder control or any groin numbness.    interval 01/30/2024 Ms. 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 At last visit, trial of gabapentin was started and PT was continued. She denies any improvement of her neuropathic pain with gabapentin. Neck pain is persistent, worse when she is stressed and tense. She still has pain in her right low back/buttock, describes it as "knife-like", rated 13/10, constant, worse with driving, bending, turning her right leg to put on her pants, socks and shoes. Pain has not improved with celecoxib. She continues to have constant numbness and tingling in her BL feet, has not established care with a new podiatrist yet.   Of note, she was at [facility] ER on 12/13/23 due to vomiting, had a CT A/P done was told she had a "spot" on her liver and abdominal findings that were not there post-op 5 years ago. She has not had any further vomiting episodes since. She [place] [facility] [place]       HPI 11/1/23 Location: low back  Onset: 10+ years of chronic low back pain, worse over the past 1 year, denies injury or trauma. neck and bilateral upper shoulder pain. bilateral hand and foot tingling.  Provocation/Palliative: any movement or activity.  Quality: sharp, stabbing, knifelike  Radiation: RLE down the lateral thigh to the knee, with numbness and tingling.  Severity:  "12"/10  Denies any associated leg weakness. Denies any loss of bowel/bladder control or any groin numbness.   Current pain medications: celebrex 200mg - by Dr Berry  Previous medications trialed: suboxone 8mg-2mg - prior opioid use many years ago. Tapered off.  gabapentin 600mg TID - not helpful  Prednisone - for foot pain     Previous procedures relevant to complaint: Injections: - none  Surgery: L TKA july 2022    Conservative therapy tried: Physical Therapy: currently for left knee    Diagnostic studies: MR C and L spine with multilevel degenerative changes, no significant central stenosis.

## 2024-04-25 ENCOUNTER — APPOINTMENT (OUTPATIENT)
Age: 59
End: 2024-04-25
Payer: COMMERCIAL

## 2024-04-25 PROCEDURE — 99213 OFFICE O/P EST LOW 20 MIN: CPT

## 2024-04-25 PROCEDURE — 73562 X-RAY EXAM OF KNEE 3: CPT | Mod: 26,LT

## 2024-04-25 NOTE — DISCUSSION/SUMMARY
[de-identified] : Patient is a 59-year-old female status post left total knee arthroplasty and subsequent left manipulation under anesthesia.  She reports she has been having a lateral and posterior knee pain.  She is seen by Dr. Randall and pain management.  Patient will have an MRI of the left knee to rule out lateral ligament injury and to evaluate for Baker's cyst.  She will take meloxicam daily for 2 weeks.  She will call after MRI is done and will then follow-up with Dr. Berry

## 2024-04-25 NOTE — PHYSICAL EXAM
[de-identified] :  GENERAL APPEARANCE: Well nourished and hydrated, pleasant, alert, and oriented x 3. Appears their stated age.  HEENT: Normocephalic, extraocular eye motion intact. Nasal septum midline. Oral cavity clear. External auditory canal clear.  RESPIRATORY: Breath sounds clear and audible in all lobes. No wheezing, No accessory muscle use. CARDIOVASCULAR: No apparent abnormalities. No lower leg edema. No varicosities. Pedal pulses are palpable. NEUROLOGIC: Sensation is normal, no muscle weakness in the upper or lower extremities. DERMATOLOGIC: No apparent skin lesions, moist, warm, no rash. SPINE: Cervical spine appears normal and moves freely; thoracic spine appears normal and moves freely; lumbosacral spine appears normal and moves freely, normal, nontender. MUSCULOSKELETAL: Hands, wrists, and elbows are normal and move freely, shoulders are normal and move freely.       [de-identified] : Left knee exam range of motion 0/130.  No effusion.  Posterior and lateral tenderness to palpation.  Mild swelling in the posterior knee. [de-identified] : Three-view imaging of the left knee shows well-fixed implants no subsidence loosening or wear

## 2024-04-25 NOTE — HISTORY OF PRESENT ILLNESS
[de-identified] : Patient is postop left total knee arthroplasty on July 26, 2022 and manipulation under anesthesia in August 2023 for consistent flexion contracture.  Patient reports she has had a persistent knee pain since surgery.  She does see Dr. Randall in pain management for back pain.  She did have recent epidural injection and reports minimal pain relief with this.  She reports that she has a lateral and posterior knee pain.  She feels she gets swelling in the back of the knee.  She denies any recent injury or trauma.  She denies fevers chills nausea vomiting redness or warmth of the knee.

## 2024-05-03 ENCOUNTER — APPOINTMENT (OUTPATIENT)
Dept: MRI IMAGING | Facility: CLINIC | Age: 59
End: 2024-05-03

## 2024-05-06 ENCOUNTER — OUTPATIENT (OUTPATIENT)
Dept: OUTPATIENT SERVICES | Facility: HOSPITAL | Age: 59
LOS: 1 days | End: 2024-05-06

## 2024-05-06 DIAGNOSIS — Z47.1 AFTERCARE FOLLOWING JOINT REPLACEMENT SURGERY: ICD-10-CM

## 2024-05-06 DIAGNOSIS — Z98.891 HISTORY OF UTERINE SCAR FROM PREVIOUS SURGERY: Chronic | ICD-10-CM

## 2024-05-06 DIAGNOSIS — Z96.652 PRESENCE OF LEFT ARTIFICIAL KNEE JOINT: Chronic | ICD-10-CM

## 2024-05-06 DIAGNOSIS — Z90.3 ACQUIRED ABSENCE OF STOMACH [PART OF]: Chronic | ICD-10-CM

## 2024-05-07 ENCOUNTER — APPOINTMENT (OUTPATIENT)
Dept: MRI IMAGING | Facility: CLINIC | Age: 59
End: 2024-05-07

## 2024-05-07 ENCOUNTER — OUTPATIENT (OUTPATIENT)
Dept: OUTPATIENT SERVICES | Facility: HOSPITAL | Age: 59
LOS: 1 days | End: 2024-05-07

## 2024-05-07 DIAGNOSIS — Z90.3 ACQUIRED ABSENCE OF STOMACH [PART OF]: Chronic | ICD-10-CM

## 2024-05-07 DIAGNOSIS — Z98.891 HISTORY OF UTERINE SCAR FROM PREVIOUS SURGERY: Chronic | ICD-10-CM

## 2024-05-07 DIAGNOSIS — Z47.1 AFTERCARE FOLLOWING JOINT REPLACEMENT SURGERY: ICD-10-CM

## 2024-05-09 ENCOUNTER — NON-APPOINTMENT (OUTPATIENT)
Age: 59
End: 2024-05-09

## 2024-05-10 DIAGNOSIS — G89.29 LOW BACK PAIN, UNSPECIFIED: ICD-10-CM

## 2024-05-10 DIAGNOSIS — M54.50 LOW BACK PAIN, UNSPECIFIED: ICD-10-CM

## 2024-05-13 ENCOUNTER — NON-APPOINTMENT (OUTPATIENT)
Age: 59
End: 2024-05-13

## 2024-05-13 DIAGNOSIS — Z47.1 AFTERCARE FOLLOWING JOINT REPLACEMENT SURGERY: ICD-10-CM

## 2024-05-13 DIAGNOSIS — Z96.652 PRESENCE OF LEFT ARTIFICIAL KNEE JOINT: ICD-10-CM

## 2024-05-13 DIAGNOSIS — Z96.652 AFTERCARE FOLLOWING JOINT REPLACEMENT SURGERY: ICD-10-CM

## 2024-05-15 ENCOUNTER — NON-APPOINTMENT (OUTPATIENT)
Age: 59
End: 2024-05-15

## 2024-05-29 ENCOUNTER — APPOINTMENT (OUTPATIENT)
Dept: PHYSICAL MEDICINE AND REHAB | Facility: CLINIC | Age: 59
End: 2024-05-29
Payer: COMMERCIAL

## 2024-05-29 VITALS
DIASTOLIC BLOOD PRESSURE: 67 MMHG | HEIGHT: 63 IN | HEART RATE: 108 BPM | WEIGHT: 119 LBS | SYSTOLIC BLOOD PRESSURE: 108 MMHG | BODY MASS INDEX: 21.09 KG/M2 | RESPIRATION RATE: 14 BRPM

## 2024-05-29 DIAGNOSIS — R20.0 ANESTHESIA OF SKIN: ICD-10-CM

## 2024-05-29 DIAGNOSIS — M53.3 SACROCOCCYGEAL DISORDERS, NOT ELSEWHERE CLASSIFIED: ICD-10-CM

## 2024-05-29 DIAGNOSIS — G62.9 POLYNEUROPATHY, UNSPECIFIED: ICD-10-CM

## 2024-05-29 PROCEDURE — G2211 COMPLEX E/M VISIT ADD ON: CPT

## 2024-05-29 PROCEDURE — 99215 OFFICE O/P EST HI 40 MIN: CPT

## 2024-05-29 RX ORDER — METHOCARBAMOL 500 MG/1
500 TABLET, FILM COATED ORAL
Qty: 120 | Refills: 0 | Status: ACTIVE | COMMUNITY
Start: 2024-05-10 | End: 1900-01-01

## 2024-05-29 NOTE — ASSESSMENT
[FreeTextEntry1] : Ms. CHARLES DENIS is a 58 year female who presents with chronic low back and neck pain, bilateral hand and foot numbness. Overall patient states that her low back and left knee is her primary pain complaint today.  On examination she has no focal neurologic deficits, negative straight leg raise, negative facet loading. Since last visit, her SIJ pain returned, and she is having worsening myofascial back pain despite adherence to PT, HEP, NSAIDs and muscle relaxers.     Impression: Chronic low back pain with right-sided sciatica bilateral sacroiliac joint dysfunction lumbar myofascial pain  Chronic neck pain Cervical myofascial pain Bilateral hand numbness Bilateral foot numbness Peripheral neuropathy  Plan:  - labwork reviewed  - f/u with Dr Berry (ortho) for L knee pain  - f/u with podiatry, patient is pending receipt of orthotics.  - referral to rheumatology for eval of red/cold fingers and toes. ?raynaud's  - i-stop reviewed  - continue celebrex 200mg qd prn, take with food, per Ortho  - continue diclofenac gel to affected area 3-4x/day PRN, rx sent. Advised not to take concurrently with PO NSAIDS - increase methocarbamol to 1000 mg BID, new rx sent  - continue PT and HEP  - massage therapy referral provided.  - Instructed patient to bring in results of nerve conduction study at next visit - will submit for TPI auth.  - referral to neuropsychology offered, patient declines for now. states she has an old therapist she can reach out to. Emotional support provided.  - recommend f/u with Dr. Rubio for management of lexapro - RTC in 3-4 weeks.    I spent a total of  46  minutes on this encounter, including documentation, face to face time, care coordination and reviewing prior records   This patient is being managed for a complex chronic pain that requires ongoing medical management. The nature of this condition requires a longitudinal relationship and monitoring over time for appropriate treatment.  The patient expressed verbal understanding and is in agreement with the plan of care. All of the patient's questions and concerns were addressed during today's visit.

## 2024-05-29 NOTE — REVIEW OF SYSTEMS
[Fever] : no fever [Chills] : no chills [Chest Pain] : no chest pain [Shortness Of Breath] : no shortness of breath [Incontinence] : no incontinence [FreeTextEntry9] : neck pain, low back pain  [de-identified] : bilateral hand numbness, bilateral foot numbness

## 2024-05-29 NOTE — HISTORY OF PRESENT ILLNESS
[FreeTextEntry1] : Ms. CHARLES DENIS is a 58 year female who presents with low back pain and neck pain  Follows with Ortho (Dr. Berry) s/p L TKA 7/26/22, post-op with left knee flexion contracture, is s/p manipulation under anesthesia  hx of GIST s/p resection 2019 at [facility]    Interval 05/29/2024 Ms. CHARLES DENIS is a 59 year female presents for follow up for LBP. Since last visit, she had her L knee MRI, discussed results with Dr. Berry that hardware was all intact. Since last visit, she notes no improvement in her pain. She tried methocarbamol 500mg BID, which was not helpful for her pain. She notes increased stressors in life, which is aggravating her pain. Pain is across her bilateral low back and into the right buttock. Pain constant with all movement. Pain has persisted despite PT and medications. Pain is currently a "12"/10.  Denies any associated leg weakness. Denies any loss of bowel/bladder control or any groin numbness. Patient also notes that she has increased life stressors, which have been causing worsening anxiety.    interval 04/17/2024  Ms. 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 She underwent right SIJ injection under fluoroscopy on 2/29/24. Since last visit, she states that she has not received any improvement in her pain. She states that her pain is still constant in the mid low back. However, upon further discussion, she notes resolution of RLE radiation and tingling and improved RLE ROM. She also states that her left knee pain is flared up with mild swelling. No redness, no fevers, no chills, no SOB.  She has continued PT with improvement in her L knee ROM, but still is unable to fully extend her knee.  She has followed up with podiatry, was given a referral for orthotics, which she did not obtain yet. Notes that her fingers and toes can turn red and white and cold in the mornings.  Denies any associated leg weakness. Denies any loss of bowel/bladder control or any groin numbness.    interval 01/30/2024 Ms. 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 At last visit, trial of gabapentin was started and PT was continued. She denies any improvement of her neuropathic pain with gabapentin. Neck pain is persistent, worse when she is stressed and tense. She still has pain in her right low back/buttock, describes it as "knife-like", rated 13/10, constant, worse with driving, bending, turning her right leg to put on her pants, socks and shoes. Pain has not improved with celecoxib. She continues to have constant numbness and tingling in her BL feet, has not established care with a new podiatrist yet.   Of note, she was at [facility] ER on 12/13/23 due to vomiting, had a CT A/P done was told she had a "spot" on her liver and abdominal findings that were not there post-op 5 years ago. She has not had any further vomiting episodes since. [place] [facility] [place]       HPI 11/1/23 Location: low back  Onset: 10+ years of chronic low back pain, worse over the past 1 year, denies injury or trauma. neck and bilateral upper shoulder pain. bilateral hand and foot tingling.  Provocation/Palliative: any movement or activity.  Quality: sharp, stabbing, knifelike  Radiation: RLE down the lateral thigh to the knee, with numbness and tingling.  Severity:  "12"/10  Denies any associated leg weakness. Denies any loss of bowel/bladder control or any groin numbness.   Current pain medications: celebrex 200mg - by Dr Berry methocarbamol 500mg BID   Previous medications trialed: suboxone 8mg-2mg - prior opioid use many years ago. Tapered off.  gabapentin 600mg TID - not helpful  Prednisone - for foot pain     Previous procedures relevant to complaint: Injections: -    2/29/24 - Right SIJ injection Surgery: L TKA july 2022    Conservative therapy tried: Physical Therapy: currently for left knee    Diagnostic studies: MR C and L spine with multilevel degenerative changes, no significant central stenosis.

## 2024-05-29 NOTE — PHYSICAL EXAM
[FreeTextEntry1] : Constitutional: In NAD, calm and cooperative Heart: well perfused Lungs: nonlabored breathing [facility] (Neck): Inspection:  All 10 fingers with red and white discoloration starting at PIP to fingertips  Palpation: Tenderness of the bilateral lower cervical paraspinals and upper trapezius,  ROM: Pain at end cervical extension and rotation.  Strength: 5/5 strength in bilateral upper extremities Reflexes: 2+ Biceps/Brachioradialis/patella (L knee unable to elicit, s/p TKA) /Achilles reflex bilaterally, Hoffmans/Clonus negative bilaterally Sensation: diminished over all 10 fingertips  Special tests: Spurlings test negative bilaterally Facet Loading: mild pos BL  tinel's mild pos left   [facility] (Back) Inspection: mild swelling around left knee, no erythema, surgical incision site well healed, skin approximated. Palpation: Tenderness of the bilateral lower lumbar paraspinals. TTP of right SIJ,  focal areas of hyperirritable, palpable, taut bands of muscles that reproduce pain referral pattern and twitch response with palpation ROM: Pain at end lumbar extension, pain with hip external rotation  Strength: 5/5 strength in bilateral lower extremities Sensation: diminished over BL feet Special tests: SLR: negative BL RIK: Positive BL  FADIR: negative BL  Gaenslen: positive BL Thigh Thrust: positive BL Nata's Finger: positive BL Facet loading: negative BL

## 2024-06-05 ENCOUNTER — APPOINTMENT (OUTPATIENT)
Dept: PHYSICAL MEDICINE AND REHAB | Facility: CLINIC | Age: 59
End: 2024-06-05
Payer: COMMERCIAL

## 2024-06-05 VITALS
SYSTOLIC BLOOD PRESSURE: 123 MMHG | WEIGHT: 121.13 LBS | HEIGHT: 63 IN | HEART RATE: 103 BPM | DIASTOLIC BLOOD PRESSURE: 71 MMHG | BODY MASS INDEX: 21.46 KG/M2 | RESPIRATION RATE: 14 BRPM

## 2024-06-05 DIAGNOSIS — G89.29 LUMBAGO WITH SCIATICA, RIGHT SIDE: ICD-10-CM

## 2024-06-05 DIAGNOSIS — M79.18 MYALGIA, OTHER SITE: ICD-10-CM

## 2024-06-05 DIAGNOSIS — M54.41 LUMBAGO WITH SCIATICA, RIGHT SIDE: ICD-10-CM

## 2024-06-05 PROCEDURE — 99213 OFFICE O/P EST LOW 20 MIN: CPT | Mod: 25

## 2024-06-05 PROCEDURE — 20552 NJX 1/MLT TRIGGER POINT 1/2: CPT

## 2024-06-05 PROCEDURE — G2211 COMPLEX E/M VISIT ADD ON: CPT

## 2024-06-05 NOTE — ASSESSMENT
[FreeTextEntry1] : Ms. CHARLES DENIS is a 58 year female who presents with chronic low back and neck pain, bilateral hand and foot numbness.  On examination she has no focal neurologic deficits, negative straight leg raise, negative facet loading. At last visit, she was having worsening myofascial back pain despite adherence to PT, HEP, NSAIDs and muscle relaxers.     Impression: Chronic low back pain with right-sided sciatica bilateral sacroiliac joint dysfunction lumbar myofascial pain  Chronic neck pain Cervical myofascial pain Bilateral hand numbness Bilateral foot numbness Peripheral neuropathy  Plan:  - lumbar TPI performed today, procedure note above - f/u with Dr Berry (ortho) for L knee pain  - f/u with podiatry, patient is pending receipt of orthotics.  - referral to rheumatology for eval of red/cold fingers and toes. ?raynaud's pending appt 7/16/24 - continue celebrex 200mg qd prn, take with food, per Ortho  - continue diclofenac gel to affected area 3-4x/day PRN, rx sent. Advised not to take concurrently with PO NSAIDS - continue methocarbamol to 1000 mg BID, new rx sent  - continue PT and HEP  - massage therapy referral previously provided.  - Instructed patient to bring in results of nerve conduction study at next visit - referral to neuropsychology offered, patient declines for now. states she has an old therapist she can reach out to. Emotional support provided.  - recommend f/u with Dr. Rubio for management of lexapro - RTC in 3 months  This patient is being managed for a complex chronic pain that requires ongoing medical management. The nature of this condition requires a longitudinal relationship and monitoring over time for appropriate treatment.   The patient expressed verbal understanding and is in agreement with the plan of care. All of the patient's questions and concerns were addressed during today's visit.

## 2024-06-05 NOTE — PHYSICAL EXAM
[FreeTextEntry1] : Constitutional: In NAD, calm and cooperative Heart: well perfused Lungs: nonlabored breathing [facility] (Neck): Inspection:  All 10 fingers with red and white discoloration starting at PIP to fingertips  Palpation: Tenderness of the bilateral lower cervical paraspinals and upper trapezius,  ROM: Pain at end cervical extension and rotation.  Strength: 5/5 strength in bilateral upper extremities Reflexes: 2+ Biceps/Brachioradialis/patella (L knee unable to elicit, s/p TKA) /Achilles reflex bilaterally, Hoffmans/Clonus negative bilaterally Sensation: diminished over all 10 fingertips  Special tests: Spurlings test negative bilaterally Facet Loading: mild pos BL  tinel's mild pos left   [facility] (Back) Inspection: mild swelling around left knee, no erythema, surgical incision site well healed, skin approximated. Palpation: Tenderness of the bilateral lower lumbar paraspinals. mild TTP of right SIJ,  focal areas of hyperirritable, palpable, taut bands of muscles that reproduce pain referral pattern and twitch response with palpation ROM: Pain at end lumbar extension, pain with hip external rotation  Strength: 5/5 strength in bilateral lower extremities Sensation: diminished over BL feet Special tests: SLR: negative BL RIK: Positive BL  FADIR: negative BL  Gaenslen: positive BL Thigh Thrust: positive BL Nata's Finger: positive BL Facet loading: negative BL

## 2024-06-05 NOTE — REVIEW OF SYSTEMS
[Fever] : no fever [Chills] : no chills [Chest Pain] : no chest pain [Shortness Of Breath] : no shortness of breath [Incontinence] : no incontinence [FreeTextEntry9] : neck pain, low back pain  [de-identified] : bilateral hand numbness, bilateral foot numbness

## 2024-06-05 NOTE — HISTORY OF PRESENT ILLNESS
[FreeTextEntry1] : Ms. CHARLES DENIS is a 58 year female who presents with low back pain and neck pain  Follows with Ortho (Dr. Berry) s/p L TKA 7/26/22, post-op with left knee flexion contracture, is s/p manipulation under anesthesia  hx of GIST s/p resection 2019 at [facility]   Interval 06/05/2024  Ms. CHARLES DENIS is a 59 year female presents for follow up for LBP. At last visit, methocarbamol dose was increased to 1000mg BID, massage therapy referral was provided. Patient continues to have low back pain with radiation into the right buttock. She has not noted significant improvement in her low back pain with methocarbamol. Continues to rate her pain "12'/10. Pain is along both sides of her low back R>L with some radiation into her right buttock. Denies radiation of pain into her LE.  Denies any associated leg weakness. Denies any loss of bowel/bladder control or any groin numbness   Interval 05/29/2024 Ms. CHARLES DENIS is a 59 year female presents for follow up for LBP. Since last visit, she had her L knee MRI, discussed results with Dr. Berry that hardware was all intact. Since last visit, she notes no improvement in her pain. She tried methocarbamol 500mg BID, which was not helpful for her pain. She notes increased stressors in life, which is aggravating her pain. Pain is across her bilateral low back and into the right buttock. Pain constant with all movement. Pain has persisted despite PT and medications. Pain is currently a "12"/10.  Denies any associated leg weakness. Denies any loss of bowel/bladder control or any groin numbness. Patient also notes that she has increased life stressors, which have been causing worsening anxiety.    interval 04/17/2024  Ms. 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 She underwent right SIJ injection under fluoroscopy on 2/29/24. Since last visit, she states that she has not received any improvement in her pain. She states that her pain is still constant in the mid low back. However, upon further discussion, she notes resolution of RLE radiation and tingling and improved RLE ROM. She also states that her left knee pain is flared up with mild swelling. No redness, no fevers, no chills, no SOB.  She has continued PT with improvement in her L knee ROM, but still is unable to fully extend her knee.  She has followed up with podiatry, was given a referral for orthotics, which she did not obtain yet. Notes that her fingers and toes can turn red and white and cold in the mornings.  Denies any associated leg weakness. Denies any loss of bowel/bladder control or any groin numbness.    interval 01/30/2024 Ms. 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 At last visit, trial of gabapentin was started and PT was continued. She denies any improvement of her neuropathic pain with gabapentin. Neck pain is persistent, worse when she is stressed and tense. She still has pain in her right low back/buttock, describes it as "knife-like", rated 13/10, constant, worse with driving, bending, turning her right leg to put on her pants, socks and shoes. Pain has not improved with celecoxib. She continues to have constant numbness and tingling in her BL feet, has not established care with a new podiatrist yet.   Of note, she was at [facility] ER on 12/13/23 due to vomiting, had a CT A/P done was told she had a "spot" on her liver and abdominal findings that were not there post-op 5 years ago. She has not had any further vomiting episodes since. She has a follow up with her GI specialist at [facility] next month.       HPI 11/1/23 Location: low back  Onset: 10+ years of chronic low back pain, worse over the past 1 year, denies injury or trauma. neck and bilateral upper shoulder pain. bilateral hand and foot tingling.  Provocation/Palliative: any movement or activity.  Quality: sharp, stabbing, knifelike  Radiation: RLE down the lateral thigh to the knee, with numbness and tingling.  Severity:  "12"/10  Denies any associated leg weakness. Denies any loss of bowel/bladder control or any groin numbness.   Current pain medications: celebrex 200mg - by Dr Berry methocarbamol 1000mg BID   Previous medications trialed: suboxone 8mg-2mg - prior opioid use many years ago. Tapered off.  gabapentin 600mg TID - not helpful  Prednisone - for foot pain     Previous procedures relevant to complaint: Injections: -    2/29/24 - Right SIJ injection Surgery: L TKA july 2022    Conservative therapy tried: Physical Therapy: currently for left knee    Diagnostic studies: MR C and L spine with multilevel degenerative changes, no significant central stenosis.

## 2024-06-13 ENCOUNTER — RX RENEWAL (OUTPATIENT)
Age: 59
End: 2024-06-13

## 2024-06-13 RX ORDER — CELECOXIB 200 MG/1
200 CAPSULE ORAL
Qty: 42 | Refills: 1 | Status: ACTIVE | COMMUNITY
Start: 2024-04-25 | End: 1900-01-01

## 2024-07-10 NOTE — H&P PST ADULT - HISTORY OF COVID-19 VACCINATION
Take prescribed medication as prescribed only  Follow-up with pediatrician  Return to emergency room for any worsening   Yes

## 2024-07-16 ENCOUNTER — APPOINTMENT (OUTPATIENT)
Dept: PHYSICAL MEDICINE AND REHAB | Facility: CLINIC | Age: 59
End: 2024-07-16
Payer: COMMERCIAL

## 2024-07-16 ENCOUNTER — APPOINTMENT (OUTPATIENT)
Dept: RHEUMATOLOGY | Facility: CLINIC | Age: 59
End: 2024-07-16
Payer: COMMERCIAL

## 2024-07-16 VITALS
DIASTOLIC BLOOD PRESSURE: 72 MMHG | OXYGEN SATURATION: 96 % | BODY MASS INDEX: 21.43 KG/M2 | WEIGHT: 121 LBS | TEMPERATURE: 98.1 F | SYSTOLIC BLOOD PRESSURE: 129 MMHG | HEART RATE: 85 BPM

## 2024-07-16 DIAGNOSIS — G62.9 POLYNEUROPATHY, UNSPECIFIED: ICD-10-CM

## 2024-07-16 DIAGNOSIS — M53.3 SACROCOCCYGEAL DISORDERS, NOT ELSEWHERE CLASSIFIED: ICD-10-CM

## 2024-07-16 DIAGNOSIS — G89.29 LOW BACK PAIN, UNSPECIFIED: ICD-10-CM

## 2024-07-16 DIAGNOSIS — R20.0 ANESTHESIA OF SKIN: ICD-10-CM

## 2024-07-16 DIAGNOSIS — M79.18 MYALGIA, OTHER SITE: ICD-10-CM

## 2024-07-16 DIAGNOSIS — M54.50 LOW BACK PAIN, UNSPECIFIED: ICD-10-CM

## 2024-07-16 DIAGNOSIS — M47.816 SPONDYLOSIS W/OUT MYELOPATHY OR RADICULOPATHY, LUMBAR REGION: ICD-10-CM

## 2024-07-16 PROCEDURE — 99204 OFFICE O/P NEW MOD 45 MIN: CPT

## 2024-07-16 PROCEDURE — 99214 OFFICE O/P EST MOD 30 MIN: CPT

## 2024-07-16 PROCEDURE — G2211 COMPLEX E/M VISIT ADD ON: CPT

## 2024-07-16 RX ORDER — PREGABALIN 25 MG/1
25 CAPSULE ORAL
Qty: 60 | Refills: 0 | Status: ACTIVE | COMMUNITY
Start: 2024-07-16 | End: 1900-01-01

## 2024-09-03 ENCOUNTER — RX RENEWAL (OUTPATIENT)
Age: 59
End: 2024-09-03

## 2024-12-10 ENCOUNTER — APPOINTMENT (OUTPATIENT)
Dept: PHYSICAL MEDICINE AND REHAB | Facility: CLINIC | Age: 59
End: 2024-12-10

## 2024-12-23 ENCOUNTER — EMERGENCY (EMERGENCY)
Facility: HOSPITAL | Age: 59
LOS: 1 days | Discharge: HOME | End: 2024-12-23
Attending: STUDENT IN AN ORGANIZED HEALTH CARE EDUCATION/TRAINING PROGRAM
Payer: COMMERCIAL

## 2024-12-23 VITALS
DIASTOLIC BLOOD PRESSURE: 73 MMHG | TEMPERATURE: 98 F | SYSTOLIC BLOOD PRESSURE: 130 MMHG | OXYGEN SATURATION: 98 % | WEIGHT: 130.07 LBS | HEART RATE: 74 BPM | RESPIRATION RATE: 18 BRPM

## 2024-12-23 DIAGNOSIS — Z98.891 HISTORY OF UTERINE SCAR FROM PREVIOUS SURGERY: Chronic | ICD-10-CM

## 2024-12-23 DIAGNOSIS — Z90.3 ACQUIRED ABSENCE OF STOMACH [PART OF]: Chronic | ICD-10-CM

## 2024-12-23 DIAGNOSIS — Z96.652 PRESENCE OF LEFT ARTIFICIAL KNEE JOINT: Chronic | ICD-10-CM

## 2024-12-23 PROCEDURE — 99282 EMERGENCY DEPT VISIT SF MDM: CPT

## 2024-12-23 PROCEDURE — 99283 EMERGENCY DEPT VISIT LOW MDM: CPT

## 2024-12-23 NOTE — ED PROVIDER NOTE - NSFOLLOWUPINSTRUCTIONS_ED_ALL_ED_FT
Weakness    Weakness is a lack of strength. You may feel weak all over your body (generalized), or you may feel weak in one specific part of your body (focal). Common causes of weakness include:    Infection and immune system disorders.  Physical exhaustion.  Internal bleeding or other blood loss that results in a lack of red blood cells (anemia).  Dehydration.  An imbalance in mineral (electrolyte) levels, such as potassium.  Heart disease, circulation problems, or stroke.    Other causes include:    Some medicines or cancer treatment.  Stress, anxiety, or depression.  Nervous system disorders.  Thyroid disorders.  Loss of muscle strength because of age or inactivity.  Poor sleep quality or sleep disorders.    The cause of your weakness may not be known. Some causes of weakness can be serious, so it is important to see your health care provider.    Follow these instructions at home:      Activity    Rest as needed.  Try to get enough sleep. Most adults need 7–8 hours of quality sleep each night. Talk to your health care provider about how much sleep you need each night.  Do exercises, such as arm curls and leg raises, for 30 minutes at least 2 days a week or as told by your health care provider. This helps build muscle strength.  Consider working with a physical therapist or  who can develop an exercise plan to help you gain muscle strength.        General instructions     Take over-the-counter and prescription medicines only as told by your health care provider.  Eat a healthy, well-balanced diet. This includes:    Proteins to build muscles, such as lean meats and fish.  Fresh fruits and vegetables.  Carbohydrates to boost energy, such as whole grains.  Drink enough fluid to keep your urine pale yellow.  Keep all follow-up visits as told by your health care provider. This is important.    Contact a health care provider if your weakness:  Does not improve or gets worse.  Affects your ability to think clearly.  Affects your ability to do your normal daily activities.    Get help right away if you:  Develop sudden weakness, especially on one side of your face or body.  Have chest pain.  Have trouble breathing or shortness of breath.  Have problems with your vision.  Have trouble talking or swallowing.  Have trouble standing or walking.  Are light-headed or lose consciousness.    Summary  Weakness is a lack of strength. You may feel weak all over your body or just in one specific part of your body.  Weakness can be caused by a variety of things. In some cases, the cause may be unknown.  Rest as needed, and try to get enough sleep. Most adults need 7–8 hours of quality sleep each night.  Eat a healthy, well-balanced diet.    ADDITIONAL NOTES AND INSTRUCTIONS    Please follow up with your Primary MD in 24-48 hr.  Seek immediate medical care for any new/worsening signs or symptoms.

## 2024-12-23 NOTE — ED PROVIDER NOTE - CLINICAL SUMMARY MEDICAL DECISION MAKING FREE TEXT BOX
59-year-old female with history of GERD, major depression, osteoarthritis, left knee surgery, recurrent peripheral edema, peripheral neuropathy presents after being found sleeping at a local -[facility].  Patient states that for the past several years after having a couple of surgeries and change of medications she has no significant trouble goal with sleeping well.  Patient states she gets 1 to 2 hours a night.  Patient is today she was home resting but has not slept well the last couple nights.  She feels that her left knee gave out which is a recurrent issue for her.  And when she was on the floor just fell asleep.  Patient has been awake, alert, oriented during ED evaluation and currently has no complaints.  Patient states she has to get to a couple doctors appointments which are scheduled for this morning and would like to be discharged.  Patient offered to take blood work to prove that she is not intoxicated.   however given that patient is awake, alert, oriented, ambulatory, with normal concentration normal behavior and's will discharge home for outpatient follow-up.

## 2024-12-23 NOTE — ED PROVIDER NOTE - PATIENT PORTAL LINK FT
You can access the FollowMyHealth Patient Portal offered by [facility] by registering at the following website: [email or web address]. By joining [facility] FollowMyHealth portal, you will also be able to view your health information using other applications (apps) compatible with our system.

## 2024-12-23 NOTE — ED ADULT NURSE NOTE - NSFALLRISKINTERV_ED_ALL_ED

## 2024-12-23 NOTE — ED PROVIDER NOTE - OBJECTIVE STATEMENT
60 yo 59-year-old female with history of GERD, major depression, osteoarthritis, left knee surgery, recurrent peripheral edema, peripheral neuropathy presents after being found sleeping at a local -[facility].  Patient states that for the past several years after having a couple of surgeries and change of medications she has no significant trouble goal with sleeping well.  Patient states she gets 1 to 2 hours a night.  Patient is today she was home resting but has not slept well the last couple nights.  She feels that her left knee gave out which is a recurrent issue for her.  And when she was on the floor just fell asleep.  Patient has been awake, alert, oriented during ED evaluation and currently has no complaints.  Patient states she has to get to a couple doctors appointments which are scheduled for this morning and would like to be discharged.  Patient offered to take blood work to prove that she is not intoxicated.   however given that patient is awake, alert, oriented, ambulatory, with normal concentration normal behavior and's will discharge home for outpatient follow-up.

## 2024-12-23 NOTE — ED ADULT TRIAGE NOTE - CHIEF COMPLAINT QUOTE
pt BIBA after being found at [address] head down , pt denies any drugs or alcohol, +face pain hits face on floor, denies any LOC,

## 2024-12-23 NOTE — ED ADULT NURSE NOTE - OBJECTIVE STATEMENT
Pt report was at [address] and her knee gave out on her and fell and hit right side of face. Denies alcohol or drugs. Pt constantly moving report she have 3 doctors appointments at 7 am.

## 2024-12-23 NOTE — ED PROVIDER NOTE - CPE EDP ENMT NORM
Quality 130: Documentation Of Current Medications In The Medical Record: Current Medications Documented Quality 110: Preventive Care And Screening: Influenza Immunization: Influenza Immunization Administered during Influenza season Quality 431: Preventive Care And Screening: Unhealthy Alcohol Use - Screening: Patient screened for unhealthy alcohol use using a single question and scores less than 2 times per year Detail Level: Detailed Quality 226: Preventive Care And Screening: Tobacco Use: Screening And Cessation Intervention: Patient screened for tobacco use and is an ex/non-smoker Quality 137: Melanoma: Continuity Of Care - Recall System: Patient information entered into a recall system that includes: target date for the next exam specified AND a process to follow up with patients regarding missed or unscheduled appointments normal... Quality 317: Preventative Care And Screening: Screening For High Blood Pressure And Follow-Up Documented: Pre-hypertensive or hypertensive blood pressure reading documented, and the indicated follow-up is documented Quality 128: Preventive Care And Screening: Body Mass Index (Bmi) Screening And Follow-Up Plan: BMI is documented within normal parameters and no follow-up plan is required. Quality 111:Pneumonia Vaccination Status For Older Adults: Pneumococcal Vaccination Previously Received

## 2024-12-30 ENCOUNTER — APPOINTMENT (OUTPATIENT)
Dept: PHYSICAL MEDICINE AND REHAB | Facility: CLINIC | Age: 59
End: 2024-12-30

## 2024-12-31 ENCOUNTER — APPOINTMENT (OUTPATIENT)
Dept: PHYSICAL MEDICINE AND REHAB | Facility: CLINIC | Age: 59
End: 2024-12-31
Payer: COMMERCIAL

## 2024-12-31 VITALS
SYSTOLIC BLOOD PRESSURE: 84 MMHG | BODY MASS INDEX: 20.55 KG/M2 | HEART RATE: 86 BPM | DIASTOLIC BLOOD PRESSURE: 52 MMHG | WEIGHT: 116 LBS | HEIGHT: 63 IN | RESPIRATION RATE: 15 BRPM

## 2024-12-31 DIAGNOSIS — G89.29 LOW BACK PAIN, UNSPECIFIED: ICD-10-CM

## 2024-12-31 DIAGNOSIS — M54.50 LOW BACK PAIN, UNSPECIFIED: ICD-10-CM

## 2024-12-31 DIAGNOSIS — M79.604 PAIN IN RIGHT LEG: ICD-10-CM

## 2024-12-31 DIAGNOSIS — G62.9 POLYNEUROPATHY, UNSPECIFIED: ICD-10-CM

## 2024-12-31 DIAGNOSIS — M79.18 MYALGIA, OTHER SITE: ICD-10-CM

## 2024-12-31 DIAGNOSIS — M47.816 SPONDYLOSIS W/OUT MYELOPATHY OR RADICULOPATHY, LUMBAR REGION: ICD-10-CM

## 2024-12-31 PROCEDURE — G2211 COMPLEX E/M VISIT ADD ON: CPT

## 2024-12-31 PROCEDURE — 99215 OFFICE O/P EST HI 40 MIN: CPT

## 2024-12-31 RX ORDER — TIZANIDINE 2 MG/1
2 TABLET ORAL
Qty: 60 | Refills: 0 | Status: ACTIVE | COMMUNITY
Start: 2024-12-31 | End: 1900-01-01

## 2024-12-31 RX ORDER — PREGABALIN 50 MG/1
50 CAPSULE ORAL TWICE DAILY
Qty: 60 | Refills: 1 | Status: ACTIVE | COMMUNITY
Start: 2024-12-31 | End: 1900-01-01

## 2025-01-01 ENCOUNTER — EMERGENCY (EMERGENCY)
Facility: HOSPITAL | Age: 60
LOS: 1 days | Discharge: LEFT BEFORE DISCHARGE | End: 2025-01-01
Attending: EMERGENCY MEDICINE
Payer: COMMERCIAL

## 2025-01-01 ENCOUNTER — OUTPATIENT (OUTPATIENT)
Dept: OUTPATIENT SERVICES | Facility: HOSPITAL | Age: 60
LOS: 1 days | End: 2025-01-01
Payer: COMMERCIAL

## 2025-01-01 ENCOUNTER — EMERGENCY (EMERGENCY)
Facility: HOSPITAL | Age: 60
LOS: 1 days | End: 2025-01-01
Attending: STUDENT IN AN ORGANIZED HEALTH CARE EDUCATION/TRAINING PROGRAM
Payer: COMMERCIAL

## 2025-01-01 VITALS
OXYGEN SATURATION: 97 % | RESPIRATION RATE: 18 BRPM | HEIGHT: 62 IN | SYSTOLIC BLOOD PRESSURE: 110 MMHG | DIASTOLIC BLOOD PRESSURE: 60 MMHG | TEMPERATURE: 97 F | HEART RATE: 93 BPM | WEIGHT: 123.02 LBS

## 2025-01-01 VITALS
TEMPERATURE: 98 F | OXYGEN SATURATION: 97 % | SYSTOLIC BLOOD PRESSURE: 138 MMHG | DIASTOLIC BLOOD PRESSURE: 63 MMHG | HEART RATE: 87 BPM | WEIGHT: 113.98 LBS | RESPIRATION RATE: 17 BRPM | HEIGHT: 61 IN

## 2025-01-01 DIAGNOSIS — S32.020A WEDGE COMPRESSION FRACTURE OF SECOND LUMBAR VERTEBRA, INITIAL ENCOUNTER FOR CLOSED FRACTURE: ICD-10-CM

## 2025-01-01 DIAGNOSIS — Z79.1 LONG TERM (CURRENT) USE OF NON-STEROIDAL ANTI-INFLAMMATORIES (NSAID): ICD-10-CM

## 2025-01-01 DIAGNOSIS — S32.050A WEDGE COMPRESSION FRACTURE OF FIFTH LUMBAR VERTEBRA, INITIAL ENCOUNTER FOR CLOSED FRACTURE: ICD-10-CM

## 2025-01-01 DIAGNOSIS — Z96.652 PRESENCE OF LEFT ARTIFICIAL KNEE JOINT: Chronic | ICD-10-CM

## 2025-01-01 DIAGNOSIS — K21.9 GASTRO-ESOPHAGEAL REFLUX DISEASE WITHOUT ESOPHAGITIS: ICD-10-CM

## 2025-01-01 DIAGNOSIS — S32.000A WEDGE COMPRESSION FRACTURE OF UNSPECIFIED LUMBAR VERTEBRA, INITIAL ENCOUNTER FOR CLOSED FRACTURE: ICD-10-CM

## 2025-01-01 DIAGNOSIS — Z98.891 HISTORY OF UTERINE SCAR FROM PREVIOUS SURGERY: Chronic | ICD-10-CM

## 2025-01-01 DIAGNOSIS — Z90.3 ACQUIRED ABSENCE OF STOMACH [PART OF]: Chronic | ICD-10-CM

## 2025-01-01 DIAGNOSIS — Z29.9 ENCOUNTER FOR PROPHYLACTIC MEASURES, UNSPECIFIED: ICD-10-CM

## 2025-01-01 DIAGNOSIS — Z01.818 ENCOUNTER FOR OTHER PREPROCEDURAL EXAMINATION: ICD-10-CM

## 2025-01-01 DIAGNOSIS — F17.200 NICOTINE DEPENDENCE, UNSPECIFIED, UNCOMPLICATED: ICD-10-CM

## 2025-01-01 LAB
A1C WITH ESTIMATED AVERAGE GLUCOSE RESULT: 5.9 % — HIGH (ref 4–5.6)
ALBUMIN SERPL ELPH-MCNC: 2.7 G/DL — LOW (ref 3.3–5.2)
ALBUMIN SERPL ELPH-MCNC: 3.8 G/DL — SIGNIFICANT CHANGE UP (ref 3.3–5.2)
ALP SERPL-CCNC: 114 U/L — SIGNIFICANT CHANGE UP (ref 40–120)
ALP SERPL-CCNC: 125 U/L — HIGH (ref 40–120)
ALT FLD-CCNC: 16 U/L — SIGNIFICANT CHANGE UP
ALT FLD-CCNC: 2731 U/L — HIGH
ANION GAP SERPL CALC-SCNC: 12 MMOL/L — SIGNIFICANT CHANGE UP (ref 5–17)
ANION GAP SERPL CALC-SCNC: 29 MMOL/L — HIGH (ref 5–17)
APTT BLD: 31.4 SEC — SIGNIFICANT CHANGE UP (ref 24.5–35.6)
APTT BLD: 34.5 SEC — SIGNIFICANT CHANGE UP (ref 26.1–36.8)
AST SERPL-CCNC: 15 U/L — SIGNIFICANT CHANGE UP
AST SERPL-CCNC: 3768 U/L — HIGH
BASOPHILS # BLD AUTO: 0.04 K/UL — SIGNIFICANT CHANGE UP (ref 0–0.2)
BASOPHILS # BLD AUTO: 0.04 K/UL — SIGNIFICANT CHANGE UP (ref 0–0.2)
BASOPHILS NFR BLD AUTO: 0.2 % — SIGNIFICANT CHANGE UP (ref 0–2)
BASOPHILS NFR BLD AUTO: 0.5 % — SIGNIFICANT CHANGE UP (ref 0–2)
BILIRUB SERPL-MCNC: 0.4 MG/DL — SIGNIFICANT CHANGE UP (ref 0.4–2)
BILIRUB SERPL-MCNC: <0.2 MG/DL — LOW (ref 0.4–2)
BLD GP AB SCN SERPL QL: SIGNIFICANT CHANGE UP
BLD GP AB SCN SERPL QL: SIGNIFICANT CHANGE UP
BUN SERPL-MCNC: 17.3 MG/DL — SIGNIFICANT CHANGE UP (ref 8–20)
BUN SERPL-MCNC: 29.9 MG/DL — HIGH (ref 8–20)
CALCIUM SERPL-MCNC: 7.5 MG/DL — LOW (ref 8.4–10.5)
CALCIUM SERPL-MCNC: 8.6 MG/DL — SIGNIFICANT CHANGE UP (ref 8.4–10.5)
CHLORIDE SERPL-SCNC: 94 MMOL/L — LOW (ref 96–108)
CHLORIDE SERPL-SCNC: 98 MMOL/L — SIGNIFICANT CHANGE UP (ref 96–108)
CO2 SERPL-SCNC: 25 MMOL/L — SIGNIFICANT CHANGE UP (ref 22–29)
CO2 SERPL-SCNC: 8 MMOL/L — CRITICAL LOW (ref 22–29)
CREAT SERPL-MCNC: 0.46 MG/DL — LOW (ref 0.5–1.3)
CREAT SERPL-MCNC: 2.21 MG/DL — HIGH (ref 0.5–1.3)
EGFR: 109 ML/MIN/1.73M2 — SIGNIFICANT CHANGE UP
EGFR: 109 ML/MIN/1.73M2 — SIGNIFICANT CHANGE UP
EGFR: 25 ML/MIN/1.73M2 — LOW
EGFR: 25 ML/MIN/1.73M2 — LOW
EOSINOPHIL # BLD AUTO: 0.01 K/UL — SIGNIFICANT CHANGE UP (ref 0–0.5)
EOSINOPHIL # BLD AUTO: 0.21 K/UL — SIGNIFICANT CHANGE UP (ref 0–0.5)
EOSINOPHIL NFR BLD AUTO: 0.1 % — SIGNIFICANT CHANGE UP (ref 0–6)
EOSINOPHIL NFR BLD AUTO: 2.5 % — SIGNIFICANT CHANGE UP (ref 0–6)
ESTIMATED AVERAGE GLUCOSE: 123 MG/DL — HIGH (ref 68–114)
GAS PNL BLDV: SIGNIFICANT CHANGE UP
GLUCOSE SERPL-MCNC: 363 MG/DL — HIGH (ref 70–99)
GLUCOSE SERPL-MCNC: 82 MG/DL — SIGNIFICANT CHANGE UP (ref 70–99)
HCT VFR BLD CALC: 29.2 % — LOW (ref 34.5–45)
HCT VFR BLD CALC: 29.5 % — LOW (ref 34.5–45)
HGB BLD-MCNC: 8.6 G/DL — LOW (ref 11.5–15.5)
HGB BLD-MCNC: 9.3 G/DL — LOW (ref 11.5–15.5)
IMM GRANULOCYTES # BLD AUTO: 0.02 K/UL — SIGNIFICANT CHANGE UP (ref 0–0.07)
IMM GRANULOCYTES # BLD AUTO: 0.76 K/UL — HIGH (ref 0–0.07)
IMM GRANULOCYTES NFR BLD AUTO: 0.2 % — SIGNIFICANT CHANGE UP (ref 0–0.9)
IMM GRANULOCYTES NFR BLD AUTO: 3.8 % — HIGH (ref 0–0.9)
INR BLD: 0.93 RATIO — SIGNIFICANT CHANGE UP (ref 0.85–1.16)
INR BLD: 1.56 RATIO — HIGH (ref 0.85–1.16)
LYMPHOCYTES # BLD AUTO: 1.64 K/UL — SIGNIFICANT CHANGE UP (ref 1–3.3)
LYMPHOCYTES # BLD AUTO: 2 K/UL — SIGNIFICANT CHANGE UP (ref 1–3.3)
LYMPHOCYTES NFR BLD AUTO: 10 % — LOW (ref 13–44)
LYMPHOCYTES NFR BLD AUTO: 19.8 % — SIGNIFICANT CHANGE UP (ref 13–44)
MAGNESIUM SERPL-MCNC: 2.8 MG/DL — HIGH (ref 1.6–2.6)
MCHC RBC-ENTMCNC: 29.2 G/DL — LOW (ref 32–36)
MCHC RBC-ENTMCNC: 29.6 PG — SIGNIFICANT CHANGE UP (ref 27–34)
MCHC RBC-ENTMCNC: 30 PG — SIGNIFICANT CHANGE UP (ref 27–34)
MCHC RBC-ENTMCNC: 31.8 G/DL — LOW (ref 32–36)
MCV RBC AUTO: 101.4 FL — HIGH (ref 80–100)
MCV RBC AUTO: 94.2 FL — SIGNIFICANT CHANGE UP (ref 80–100)
MONOCYTES # BLD AUTO: 0.71 K/UL — SIGNIFICANT CHANGE UP (ref 0–0.9)
MONOCYTES # BLD AUTO: 1.29 K/UL — HIGH (ref 0–0.9)
MONOCYTES NFR BLD AUTO: 6.5 % — SIGNIFICANT CHANGE UP (ref 2–14)
MONOCYTES NFR BLD AUTO: 8.6 % — SIGNIFICANT CHANGE UP (ref 2–14)
MRSA PCR RESULT.: SIGNIFICANT CHANGE UP
NEUTROPHILS # BLD AUTO: 15.87 K/UL — HIGH (ref 1.8–7.4)
NEUTROPHILS # BLD AUTO: 5.66 K/UL — SIGNIFICANT CHANGE UP (ref 1.8–7.4)
NEUTROPHILS NFR BLD AUTO: 68.4 % — SIGNIFICANT CHANGE UP (ref 43–77)
NEUTROPHILS NFR BLD AUTO: 79.4 % — HIGH (ref 43–77)
NRBC # BLD AUTO: 0 K/UL — SIGNIFICANT CHANGE UP (ref 0–0)
NRBC # BLD AUTO: 0.04 K/UL — HIGH (ref 0–0)
NRBC # FLD: 0 K/UL — SIGNIFICANT CHANGE UP (ref 0–0)
NRBC # FLD: 0.04 K/UL — HIGH (ref 0–0)
NRBC BLD AUTO-RTO: 0 /100 WBCS — SIGNIFICANT CHANGE UP (ref 0–0)
NRBC BLD AUTO-RTO: 0 /100 WBCS — SIGNIFICANT CHANGE UP (ref 0–0)
NT-PROBNP SERPL-SCNC: HIGH PG/ML (ref 0–300)
PHOSPHATE SERPL-MCNC: 12.1 MG/DL — HIGH (ref 2.4–4.7)
PLATELET # BLD AUTO: 200 K/UL — SIGNIFICANT CHANGE UP (ref 150–400)
PLATELET # BLD AUTO: 424 K/UL — HIGH (ref 150–400)
PMV BLD: 10.2 FL — SIGNIFICANT CHANGE UP (ref 7–13)
PMV BLD: 8.9 FL — SIGNIFICANT CHANGE UP (ref 7–13)
POTASSIUM SERPL-MCNC: 4.7 MMOL/L — SIGNIFICANT CHANGE UP (ref 3.5–5.3)
POTASSIUM SERPL-MCNC: 5.8 MMOL/L — HIGH (ref 3.5–5.3)
POTASSIUM SERPL-SCNC: 4.7 MMOL/L — SIGNIFICANT CHANGE UP (ref 3.5–5.3)
POTASSIUM SERPL-SCNC: 5.8 MMOL/L — HIGH (ref 3.5–5.3)
PROT SERPL-MCNC: 4.7 G/DL — LOW (ref 6.6–8.7)
PROT SERPL-MCNC: 6.4 G/DL — LOW (ref 6.6–8.7)
PROTHROM AB SERPL-ACNC: 10.8 SEC — SIGNIFICANT CHANGE UP (ref 9.9–13.4)
PROTHROM AB SERPL-ACNC: 17.6 SEC — HIGH (ref 9.9–13.4)
RBC # BLD: 2.91 M/UL — LOW (ref 3.8–5.2)
RBC # BLD: 3.1 M/UL — LOW (ref 3.8–5.2)
RBC # FLD: 13.9 % — SIGNIFICANT CHANGE UP (ref 10.3–14.5)
RBC # FLD: 14.9 % — HIGH (ref 10.3–14.5)
S AUREUS DNA NOSE QL NAA+PROBE: SIGNIFICANT CHANGE UP
SALICYLATES SERPL-MCNC: 1.3 MG/DL — LOW (ref 10–20)
SODIUM SERPL-SCNC: 131 MMOL/L — LOW (ref 135–145)
SODIUM SERPL-SCNC: 135 MMOL/L — SIGNIFICANT CHANGE UP (ref 135–145)
T3 SERPL-MCNC: 122 NG/DL — SIGNIFICANT CHANGE UP (ref 80–200)
T4 AB SER-ACNC: 8.8 UG/DL — SIGNIFICANT CHANGE UP (ref 4.5–12)
TROPONIN T, HIGH SENSITIVITY RESULT: 117 NG/L — HIGH (ref 0–51)
TSH SERPL-MCNC: 1.75 UIU/ML — SIGNIFICANT CHANGE UP (ref 0.27–4.2)
TSH SERPL-MCNC: 3.72 UIU/ML — SIGNIFICANT CHANGE UP (ref 0.27–4.2)
WBC # BLD: 19.97 K/UL — HIGH (ref 3.8–10.5)
WBC # BLD: 8.28 K/UL — SIGNIFICANT CHANGE UP (ref 3.8–10.5)
WBC # FLD AUTO: 19.97 K/UL — HIGH (ref 3.8–10.5)
WBC # FLD AUTO: 8.28 K/UL — SIGNIFICANT CHANGE UP (ref 3.8–10.5)

## 2025-01-01 PROCEDURE — 86901 BLOOD TYPING SEROLOGIC RH(D): CPT

## 2025-01-01 PROCEDURE — 99283 EMERGENCY DEPT VISIT LOW MDM: CPT | Mod: 25

## 2025-01-01 PROCEDURE — 12051 INTMD RPR FACE/MM 2.5 CM/<: CPT

## 2025-01-01 PROCEDURE — G0463: CPT

## 2025-01-01 PROCEDURE — 93005 ELECTROCARDIOGRAM TRACING: CPT

## 2025-01-01 PROCEDURE — 85730 THROMBOPLASTIN TIME PARTIAL: CPT

## 2025-01-01 PROCEDURE — 83036 HEMOGLOBIN GLYCOSYLATED A1C: CPT

## 2025-01-01 PROCEDURE — 36415 COLL VENOUS BLD VENIPUNCTURE: CPT

## 2025-01-01 PROCEDURE — 85610 PROTHROMBIN TIME: CPT

## 2025-01-01 PROCEDURE — 80053 COMPREHEN METABOLIC PANEL: CPT

## 2025-01-01 PROCEDURE — 84443 ASSAY THYROID STIM HORMONE: CPT

## 2025-01-01 PROCEDURE — 86850 RBC ANTIBODY SCREEN: CPT

## 2025-01-01 PROCEDURE — 86900 BLOOD TYPING SEROLOGIC ABO: CPT

## 2025-01-01 PROCEDURE — 99284 EMERGENCY DEPT VISIT MOD MDM: CPT | Mod: 25

## 2025-01-01 PROCEDURE — 90471 IMMUNIZATION ADMIN: CPT

## 2025-01-01 PROCEDURE — 93010 ELECTROCARDIOGRAM REPORT: CPT

## 2025-01-01 PROCEDURE — 85025 COMPLETE CBC W/AUTO DIFF WBC: CPT

## 2025-01-01 PROCEDURE — 13131 CMPLX RPR F/C/C/M/N/AX/G/H/F: CPT

## 2025-01-01 PROCEDURE — 84436 ASSAY OF TOTAL THYROXINE: CPT

## 2025-01-01 PROCEDURE — 87640 STAPH A DNA AMP PROBE: CPT

## 2025-01-01 PROCEDURE — 87641 MR-STAPH DNA AMP PROBE: CPT

## 2025-01-01 PROCEDURE — 84480 ASSAY TRIIODOTHYRONINE (T3): CPT

## 2025-01-01 PROCEDURE — 90715 TDAP VACCINE 7 YRS/> IM: CPT

## 2025-01-01 RX ORDER — METHOCARBAMOL 500 MG/1
0 TABLET, FILM COATED ORAL
Refills: 0 | DISCHARGE

## 2025-01-01 RX ORDER — CEPHALEXIN 250 MG/1
500 CAPSULE ORAL ONCE
Refills: 0 | Status: COMPLETED | OUTPATIENT
Start: 2025-01-01 | End: 2025-01-01

## 2025-01-01 RX ORDER — CARISOPRODOL 350 MG/1
0 TABLET ORAL
Refills: 0 | DISCHARGE

## 2025-01-01 RX ORDER — ONDANSETRON HCL/PF 4 MG/2 ML
0 VIAL (ML) INJECTION
Refills: 0 | DISCHARGE

## 2025-01-01 RX ORDER — HYDROCORTISONE 20 MG
100 TABLET ORAL ONCE
Refills: 0 | Status: COMPLETED | OUTPATIENT
Start: 2025-01-01 | End: 2025-01-01

## 2025-01-01 RX ORDER — ALTEPLASE 2.2 MG/2ML
50 INJECTION, POWDER, LYOPHILIZED, FOR SOLUTION INTRAVENOUS ONCE
Refills: 0 | Status: COMPLETED | OUTPATIENT
Start: 2025-01-01 | End: 2025-01-01

## 2025-01-01 RX ORDER — ACETAMINOPHEN 500 MG/5ML
650 LIQUID (ML) ORAL ONCE
Refills: 0 | Status: COMPLETED | OUTPATIENT
Start: 2025-01-01 | End: 2025-01-01

## 2025-01-01 RX ORDER — CELECOXIB 50 MG/1
0 CAPSULE ORAL
Refills: 0 | DISCHARGE

## 2025-01-01 RX ORDER — DEXTROSE 50 % IN WATER 50 %
100 SYRINGE (ML) INTRAVENOUS ONCE
Refills: 0 | Status: COMPLETED | OUTPATIENT
Start: 2025-01-01 | End: 2025-01-01

## 2025-01-01 RX ORDER — OMEPRAZOLE 20 MG/1
0 CAPSULE, DELAYED RELEASE ORAL
Refills: 0 | DISCHARGE

## 2025-01-01 RX ORDER — GABAPENTIN 400 MG/1
0 CAPSULE ORAL
Refills: 0 | DISCHARGE

## 2025-01-01 RX ORDER — CEPHALEXIN 250 MG/1
1 CAPSULE ORAL
Qty: 10 | Refills: 0
Start: 2025-01-01 | End: 2025-01-01

## 2025-01-01 RX ADMIN — Medication 100 MILLIGRAM(S): at 19:18

## 2025-01-01 RX ADMIN — Medication 100 MILLILITER(S): at 19:18

## 2025-01-01 RX ADMIN — ALTEPLASE 3000 MILLIGRAM(S): 2.2 INJECTION, POWDER, LYOPHILIZED, FOR SOLUTION INTRAVENOUS at 19:33

## 2025-01-01 RX ADMIN — Medication 650 MILLIGRAM(S): at 20:59

## 2025-01-01 RX ADMIN — CEPHALEXIN 500 MILLIGRAM(S): 250 CAPSULE ORAL at 20:32

## 2025-01-08 ENCOUNTER — NON-APPOINTMENT (OUTPATIENT)
Age: 60
End: 2025-01-08

## 2025-01-15 ENCOUNTER — APPOINTMENT (OUTPATIENT)
Dept: VASCULAR SURGERY | Facility: CLINIC | Age: 60
End: 2025-01-15
Payer: COMMERCIAL

## 2025-01-15 VITALS
SYSTOLIC BLOOD PRESSURE: 99 MMHG | WEIGHT: 114.5 LBS | OXYGEN SATURATION: 97 % | DIASTOLIC BLOOD PRESSURE: 63 MMHG | TEMPERATURE: 97.7 F | HEIGHT: 61 IN | HEART RATE: 83 BPM | RESPIRATION RATE: 16 BRPM | BODY MASS INDEX: 21.62 KG/M2

## 2025-01-15 DIAGNOSIS — M79.604 PAIN IN RIGHT LEG: ICD-10-CM

## 2025-01-15 DIAGNOSIS — M79.89 OTHER SPECIFIED SOFT TISSUE DISORDERS: ICD-10-CM

## 2025-01-15 PROBLEM — M79.606 LOWER EXTREMITY PAIN: Status: ACTIVE | Noted: 2025-01-15

## 2025-01-15 PROCEDURE — 99202 OFFICE O/P NEW SF 15 MIN: CPT

## 2025-01-15 PROCEDURE — 93923 UPR/LXTR ART STDY 3+ LVLS: CPT

## 2025-01-22 ENCOUNTER — APPOINTMENT (OUTPATIENT)
Dept: PHYSICAL MEDICINE AND REHAB | Facility: CLINIC | Age: 60
End: 2025-01-22
Payer: COMMERCIAL

## 2025-01-22 VITALS
BODY MASS INDEX: 21.9 KG/M2 | HEIGHT: 61 IN | WEIGHT: 116 LBS | HEART RATE: 72 BPM | SYSTOLIC BLOOD PRESSURE: 107 MMHG | RESPIRATION RATE: 15 BRPM | DIASTOLIC BLOOD PRESSURE: 64 MMHG

## 2025-01-22 DIAGNOSIS — M79.606 PAIN IN LEG, UNSPECIFIED: ICD-10-CM

## 2025-01-22 DIAGNOSIS — M54.41 LUMBAGO WITH SCIATICA, RIGHT SIDE: ICD-10-CM

## 2025-01-22 DIAGNOSIS — M47.816 SPONDYLOSIS W/OUT MYELOPATHY OR RADICULOPATHY, LUMBAR REGION: ICD-10-CM

## 2025-01-22 DIAGNOSIS — G89.29 LUMBAGO WITH SCIATICA, RIGHT SIDE: ICD-10-CM

## 2025-01-22 DIAGNOSIS — F32.A DEPRESSION, UNSPECIFIED: ICD-10-CM

## 2025-01-22 PROCEDURE — 99214 OFFICE O/P EST MOD 30 MIN: CPT

## 2025-01-22 PROCEDURE — G2211 COMPLEX E/M VISIT ADD ON: CPT | Mod: NC

## 2025-01-23 ENCOUNTER — APPOINTMENT (OUTPATIENT)
Dept: PHYSICAL MEDICINE AND REHAB | Facility: GI CENTER | Age: 60
End: 2025-01-23

## 2025-01-29 ENCOUNTER — EMERGENCY (EMERGENCY)
Facility: HOSPITAL | Age: 60
LOS: 1 days | Discharge: HOME | End: 2025-01-29
Attending: STUDENT IN AN ORGANIZED HEALTH CARE EDUCATION/TRAINING PROGRAM
Payer: COMMERCIAL

## 2025-01-29 VITALS
OXYGEN SATURATION: 99 % | DIASTOLIC BLOOD PRESSURE: 83 MMHG | SYSTOLIC BLOOD PRESSURE: 135 MMHG | HEIGHT: 61 IN | TEMPERATURE: 98 F | WEIGHT: 113.98 LBS | RESPIRATION RATE: 16 BRPM | HEART RATE: 89 BPM

## 2025-01-29 DIAGNOSIS — Z98.891 HISTORY OF UTERINE SCAR FROM PREVIOUS SURGERY: Chronic | ICD-10-CM

## 2025-01-29 DIAGNOSIS — Z96.652 PRESENCE OF LEFT ARTIFICIAL KNEE JOINT: Chronic | ICD-10-CM

## 2025-01-29 LAB
ALBUMIN SERPL ELPH-MCNC: 4.1 G/DL — SIGNIFICANT CHANGE UP (ref 3.3–5.2)
ALP SERPL-CCNC: 115 U/L — SIGNIFICANT CHANGE UP (ref 40–120)
ALT FLD-CCNC: 15 U/L — SIGNIFICANT CHANGE UP
ANION GAP SERPL CALC-SCNC: 14 MMOL/L — SIGNIFICANT CHANGE UP (ref 5–17)
APTT BLD: 29 SEC — SIGNIFICANT CHANGE UP (ref 24.5–35.6)
AST SERPL-CCNC: 20 U/L — SIGNIFICANT CHANGE UP
BASOPHILS # BLD AUTO: 0.03 K/UL — SIGNIFICANT CHANGE UP (ref 0–0.2)
BASOPHILS NFR BLD AUTO: 0.3 % — SIGNIFICANT CHANGE UP (ref 0–2)
BILIRUB SERPL-MCNC: 0.4 MG/DL — SIGNIFICANT CHANGE UP (ref 0.4–2)
BLD GP AB SCN SERPL QL: SIGNIFICANT CHANGE UP
BUN SERPL-MCNC: 12.3 MG/DL — SIGNIFICANT CHANGE UP (ref 8–20)
CALCIUM SERPL-MCNC: 9 MG/DL — SIGNIFICANT CHANGE UP (ref 8.4–10.5)
CHLORIDE SERPL-SCNC: 99 MMOL/L — SIGNIFICANT CHANGE UP (ref 96–108)
CO2 SERPL-SCNC: 24 MMOL/L — SIGNIFICANT CHANGE UP (ref 22–29)
CREAT SERPL-MCNC: 0.44 MG/DL — LOW (ref 0.5–1.3)
EGFR: 110 ML/MIN/1.73M2 — SIGNIFICANT CHANGE UP
EOSINOPHIL # BLD AUTO: 0.01 K/UL — SIGNIFICANT CHANGE UP (ref 0–0.5)
EOSINOPHIL NFR BLD AUTO: 0.1 % — SIGNIFICANT CHANGE UP (ref 0–6)
GLUCOSE SERPL-MCNC: 114 MG/DL — HIGH (ref 70–99)
HCT VFR BLD CALC: 32.9 % — LOW (ref 34.5–45)
HGB BLD-MCNC: 11 G/DL — LOW (ref 11.5–15.5)
IMM GRANULOCYTES NFR BLD AUTO: 0.4 % — SIGNIFICANT CHANGE UP (ref 0–0.9)
INR BLD: 0.99 RATIO — SIGNIFICANT CHANGE UP (ref 0.85–1.16)
LYMPHOCYTES # BLD AUTO: 1.26 K/UL — SIGNIFICANT CHANGE UP (ref 1–3.3)
LYMPHOCYTES # BLD AUTO: 12 % — LOW (ref 13–44)
MCHC RBC-ENTMCNC: 29.6 PG — SIGNIFICANT CHANGE UP (ref 27–34)
MCHC RBC-ENTMCNC: 33.4 G/DL — SIGNIFICANT CHANGE UP (ref 32–36)
MCV RBC AUTO: 88.4 FL — SIGNIFICANT CHANGE UP (ref 80–100)
MONOCYTES # BLD AUTO: 0.77 K/UL — SIGNIFICANT CHANGE UP (ref 0–0.9)
MONOCYTES NFR BLD AUTO: 7.3 % — SIGNIFICANT CHANGE UP (ref 2–14)
NEUTROPHILS # BLD AUTO: 8.42 K/UL — HIGH (ref 1.8–7.4)
NEUTROPHILS NFR BLD AUTO: 79.9 % — HIGH (ref 43–77)
PLATELET # BLD AUTO: 215 K/UL — SIGNIFICANT CHANGE UP (ref 150–400)
POTASSIUM SERPL-MCNC: 3.6 MMOL/L — SIGNIFICANT CHANGE UP (ref 3.5–5.3)
POTASSIUM SERPL-SCNC: 3.6 MMOL/L — SIGNIFICANT CHANGE UP (ref 3.5–5.3)
PROT SERPL-MCNC: 6.9 G/DL — SIGNIFICANT CHANGE UP (ref 6.6–8.7)
PROTHROM AB SERPL-ACNC: 11.5 SEC — SIGNIFICANT CHANGE UP (ref 9.9–13.4)
RBC # BLD: 3.72 M/UL — LOW (ref 3.8–5.2)
RBC # FLD: 13.2 % — SIGNIFICANT CHANGE UP (ref 10.3–14.5)
SODIUM SERPL-SCNC: 137 MMOL/L — SIGNIFICANT CHANGE UP (ref 135–145)
WBC # BLD: 10.53 K/UL — HIGH (ref 3.8–10.5)
WBC # FLD AUTO: 10.53 K/UL — HIGH (ref 3.8–10.5)

## 2025-01-29 PROCEDURE — 99223 1ST HOSP IP/OBS HIGH 75: CPT

## 2025-01-29 PROCEDURE — 73562 X-RAY EXAM OF KNEE 3: CPT | Mod: 26,LT

## 2025-01-29 PROCEDURE — 99223 1ST HOSP IP/OBS HIGH 75: CPT | Mod: 57

## 2025-01-29 PROCEDURE — 71260 CT THORAX DX C+: CPT | Mod: 26

## 2025-01-29 PROCEDURE — 72131 CT LUMBAR SPINE W/O DYE: CPT | Mod: 26

## 2025-01-29 PROCEDURE — 72128 CT CHEST SPINE W/O DYE: CPT | Mod: 26

## 2025-01-29 PROCEDURE — 70450 CT HEAD/BRAIN W/O DYE: CPT | Mod: 26

## 2025-01-29 PROCEDURE — 72125 CT NECK SPINE W/O DYE: CPT | Mod: 26

## 2025-01-29 PROCEDURE — 22310 CLOSED TX VERT FX W/O MANJ: CPT

## 2025-01-29 PROCEDURE — 74177 CT ABD & PELVIS W/CONTRAST: CPT | Mod: 26

## 2025-01-29 RX ORDER — KETOROLAC TROMETHAMINE 10 MG
15 TABLET ORAL ONCE
Refills: 0 | Status: DISCONTINUED | OUTPATIENT
Start: 2025-01-29 | End: 2025-01-29

## 2025-01-29 RX ORDER — DEXAMETHASONE SODIUM PHOSPHATE 4 MG/ML
6 INJECTION, SOLUTION INTRA-ARTICULAR; INTRALESIONAL; INTRAMUSCULAR; INTRAVENOUS; SOFT TISSUE EVERY 6 HOURS
Refills: 0 | Status: COMPLETED | OUTPATIENT
Start: 2025-01-29 | End: 2025-01-30

## 2025-01-29 RX ORDER — MORPHINE SULFATE 60 MG/1
4 TABLET, FILM COATED, EXTENDED RELEASE ORAL ONCE
Refills: 0 | Status: DISCONTINUED | OUTPATIENT
Start: 2025-01-29 | End: 2025-01-29

## 2025-01-29 RX ORDER — LIDOCAINE HYDROCHLORIDE 30 MG/G
1 CREAM TOPICAL ONCE
Refills: 0 | Status: COMPLETED | OUTPATIENT
Start: 2025-01-29 | End: 2025-01-29

## 2025-01-29 RX ORDER — ACETAMINOPHEN 160 MG/5ML
1000 SUSPENSION ORAL ONCE
Refills: 0 | Status: COMPLETED | OUTPATIENT
Start: 2025-01-29 | End: 2025-01-29

## 2025-01-29 RX ORDER — ONDANSETRON 4 MG/1
4 TABLET, ORALLY DISINTEGRATING ORAL ONCE
Refills: 0 | Status: COMPLETED | OUTPATIENT
Start: 2025-01-29 | End: 2025-01-29

## 2025-01-29 RX ADMIN — MORPHINE SULFATE 4 MILLIGRAM(S): 60 TABLET, FILM COATED, EXTENDED RELEASE ORAL at 19:31

## 2025-01-29 RX ADMIN — LIDOCAINE HYDROCHLORIDE 1 PATCH: 30 CREAM TOPICAL at 19:30

## 2025-01-29 RX ADMIN — Medication 10 MILLIGRAM(S): at 23:12

## 2025-01-29 RX ADMIN — ACETAMINOPHEN 400 MILLIGRAM(S): 160 SUSPENSION ORAL at 19:30

## 2025-01-29 RX ADMIN — ONDANSETRON 4 MILLIGRAM(S): 4 TABLET, ORALLY DISINTEGRATING ORAL at 21:18

## 2025-01-29 NOTE — ED PROVIDER NOTE - OBJECTIVE STATEMENT
59-year-old female; with PMH significant for MDD, GERD, osteoarthritis, left knee TKR, peripheral neuropathy from chronic back injury; now presenting with back pain s/p fall.  Patient reports feeling lightheaded with vertiginous symptoms over the past 3 to 4 days.  Reports nausea with headaches.  Patient states secondary to the unsteady gait she slipped and fell backward approximately 2 to 3 days ago.  Patient complaining of headache–occipital, associate with nausea.  Complaining of lower back pain.  Denies any new numbness or tingling.  Denies any focal weakness.  Denies bowel or bladder incontinence.

## 2025-01-29 NOTE — ED ADULT NURSE NOTE - NSFALLHARMRISKINTERV_ED_ALL_ED
Assistance OOB with selected safe patient handling equipment if applicable/Communicate risk of Fall with Harm to all staff, patient, and family/Provide visual cue: red socks, yellow wristband, yellow gown, etc/Reinforce activity limits and safety measures with patient and family/Bed in lowest position, wheels locked, appropriate side rails in place/Call bell, personal items and telephone in reach/Instruct patient to call for assistance before getting out of bed/chair/stretcher/Non-slip footwear applied when patient is off stretcher/[place] to call system/Physically safe environment - no spills, clutter or unnecessary equipment/Purposeful Proactive Rounding/Room/bathroom lighting operational, light cord in reach

## 2025-01-29 NOTE — ED PROVIDER NOTE - CLINICAL SUMMARY MEDICAL DECISION MAKING FREE TEXT BOX
59-year-old female; with PMH significant for MDD, GERD, osteoarthritis, left knee TKR, peripheral neuropathy from chronic back injury; now presenting with back pain s/p fall.  Patient reports feeling lightheaded with vertiginous symptoms over the past 3 to 4 days.  Reports nausea with headaches.  Patient states secondary to the unsteady gait she slipped and fell backward approximately 2 to 3 days ago.  Patient complaining of headache–occipital, associate with nausea.  Complaining of lower back pain.  Denies any new numbness or tingling.  Denies any focal weakness.  Denies bowel or bladder incontinence. will do labs, trauma ct eval,

## 2025-01-29 NOTE — ED ADULT TRIAGE NOTE - CADM TRG TX PRIOR TO ARRIVAL
1  Please decrease keppra to 250mg (half a tablet) twice per day for one month  Then stop      2  As we discussed, there is a risk of seizure coming off of the medication even though he has had a normal EEG and no seizures for over two years  Please use caution when coming off of medication - make sure he is supervised and be cautious that he may have seizures (no ladders, baths, or swimming by himself)  3  Call the office with any seizures and we would restart keppra  4  Please follow up with Dr Jamie Moody in 3 months or sooner if needed  none

## 2025-01-29 NOTE — ED ADULT NURSE NOTE - OBJECTIVE STATEMENT
Pt A&Ox4, resp wnl. Pt presents to the ED for 2 falls in the past 2 days. Pt states she had "vertigo-like dizziness" for 3 months, and fell 2 nights ago and hit her head and landed on her back on her wooden kitchen floor. Last night the pt fell and landed on her left knee getting out of bed. Pt endorsed dizziness as the reason for both falls. Pt is now endorsing left knee pain and back pain that she has "24/7". Pt stated she has felt nauseous the past 2 days and vomited one time but is unsure what day that was. Pt states she has been going to PT for her knee and back with no relief of symptoms and stated it has been getting worse recently. Denies AC use, hip pain, HA, CP, SOB. PMHx GERD, OA, depression

## 2025-01-29 NOTE — CONSULT NOTE ADULT - SUBJECTIVE AND OBJECTIVE BOX
CHARLES DENIS  [id number]    SUBJECTIVE: Patient is a 59y Female with a PMHx chronic lower back pain presenting to the emergency department with worsening lower back pain. Patient fell 2 days ago while at home due to dizziness and nausea. Patient fell backwards and had significant lower back pain after fall. Patient has been able to ambulate since fall but it has been very painful to do so. Patient denies saddle numbness, urinary retention or difficulty controlling bowels/bladder. Patient admits to numbness of all toes at baseline and intermittent swelling of the lower extremities. Patient sees a doctor outpatient who gives her epidural and trigger point injections that help with back pain sometimes but not all the time. No other orthopedic complaints at this time.     REVIEW OF SYSTEM  General: Alert, oriented, and responsive   MSK: See HPI   Neuro: No new sensory or motor changes     OBJECTIVE:   Vital Signs Last 24 Hrs  T(C): 36.4 (29 Jan 2025 19:07), Max: 36.9 (29 Jan 2025 17:32)  T(F): 97.6 (29 Jan 2025 19:07), Max: 98.5 (29 Jan 2025 17:32)  HR: 57 (29 Jan 2025 19:07) (57 - 89)  BP: 127/74 (29 Jan 2025 19:07) (127/74 - 135/83)  BP(mean): --  RR: 18 (29 Jan 2025 19:07) (16 - 18)  SpO2: 96% (29 Jan 2025 19:07) (96% - 99%)    Parameters below as of 29 Jan 2025 19:07  Patient On (Oxygen Delivery Method): room air                  11.0   10.53 )-----------( 215      ( 29 Jan 2025 19:30 )             32.9       PHYSICAL EXAM:  Constitutional: Alert, awake  Sensation grossly intact to the lower extremities. Diminished bilaterally to the toes   Spine: TTP diffusely down thoracic and lumbar paraspinal muscles.          Motor exam:          Lower extremity                      HF(l2)   KE(l3)    TA(l4)   EHL(l5)  GS(s1)                                                 R        5/5        5/5        5/5       5/5         5/5                                               L         5/5        5/5       5/5       5/5          5/5                                                 B/L LE: warm well perfused; capillary refill <3 seconds. BCR. Calves soft NT  < from: CT Lumbar Spine Reform No Cont (01.29.25 @ 21:10) >  ACC: [id number] EXAM:  CT REFORM SPINE T   ORDERED BY: ENRRIQUE PATEL   ACC: [id number] EXAM:  CT REFORM SPINE L   ORDERED BY: ENRRIQUE PATEL   PROCEDURE DATE:  01/29/2025    INTERPRETATION:  CLINICAL INFORMATION: Back and flank pain. Status post   fall 2 days ago.  COMPARISON: None.  CONTRAST/COMPLICATIONS:  IV Contrast: IV contrast documented in unlinked concurrent exam   (accession [id number]), Omnipaque 350 (accession [id number]), NONE (accession   [id number]), NONE (accession [id number])  90 cc administered   10 cc discarded  Oral Contrast: NONE  PROCEDURE:  CT of the Chest, Abdomen and Pelvis was performed.  Cone-down reconstructions of the thoracic and lumbar spine were performed.  Sagittal and coronal reformats were performed.  FINDINGS:  CHEST:  LUNGS AND LARGE AIRWAYS: Patent central airways. Mild biapical pleural   thickening and parenchymal scarring. Mild centrilobular emphysema.  PLEURA: No pleural effusion. No pneumothorax or pneumomediastinum.  VESSELS: Within normal limits.  HEART: Heart size is normal. No pericardial effusion.  MEDIASTINUM AND HUMBERTO: Shotty right hilar lymph node. There is no   mediastinal or left hilar adenopathy.  CHEST WALL AND LOWER NECK: Within normal limits.  ABDOMEN AND PELVIS:  LIVER: There is geographic fatty deposition in the left lobe of the liver   adjacent to the falciform ligament. Otherwise, within normal limits. The   hepatic veins and portal vein are patent.  BILE DUCTS: Normal caliber.  GALLBLADDER: Within normal limits.  SPLEEN: Within normal limits.  PANCREAS: Within normal limits.  ADRENALS: Within normal limits.  KIDNEYS/URETERS: Within normal limits.  BLADDER: Within normal limits.  REPRODUCTIVE ORGANS: 3 cm right ovarian cyst. The uterus and left adnexa   are unremarkable. Trace free fluid in the cul-de-sac.  BOWEL: Status post Billroth II. Sigmoid diverticulosis. No bowel   obstruction. Appendix is normal.  PERITONEUM/RETROPERITONEUM: Within normal limits.  VESSELS: Within normal limits.  LYMPH NODES: No lymphadenopathy.  ABDOMINAL WALL: Within normal limits.  BONES: There is a mild compression deformity ofL5 with a nondisplaced   vertically oriented fracture of the anterior body. There is a mild   compression deformity of the superior L2 vertebral body with 2 mm   retropulsion. There is a chronic mild compression deformity of the   superior endplate of T7. There is mild anterior endplate spurring at   T3-T4. Trace anterolisthesis of L4 on L5. Degenerative disc space   narrowing at L5-S1.  IMPRESSION: Acute compression deformities of L2 and L5 as described.  --- End of Report ---  JUDY WHITFIELD MD; Attending Radiologist  This document has been electronically signed. Jan 29 2025  9:22PM  < end of copied text >      A/P :  59y Female with acute lower back pain s/p fall with acute lumbar compression fractures   -    Official plan pending discussion with Dr. Cummings   -    Pain control; Toradol and decadron if medically appropriate   -    WBAT of bilateral lower extremities    CHARLES DENIS  [id number]    SUBJECTIVE: Patient is a 59y Female with a PMHx chronic lower back pain presenting to the emergency department with worsening lower back pain. Patient fell 2 days ago while at home due to dizziness and nausea. Patient fell backwards and had significant lower back pain after fall. Patient has been able to ambulate since fall but it has been very painful to do so. Patient denies saddle numbness, urinary retention or difficulty controlling bowels/bladder. Patient admits to numbness of all toes at baseline and intermittent swelling of the lower extremities. Patient sees a doctor outpatient who gives her epidural and trigger point injections that help with back pain sometimes but not all the time. No other orthopedic complaints at this time.     REVIEW OF SYSTEM  General: Alert, oriented, and responsive   MSK: See HPI   Neuro: No new sensory or motor changes     OBJECTIVE:   Vital Signs Last 24 Hrs  T(C): 36.4 (29 Jan 2025 19:07), Max: 36.9 (29 Jan 2025 17:32)  T(F): 97.6 (29 Jan 2025 19:07), Max: 98.5 (29 Jan 2025 17:32)  HR: 57 (29 Jan 2025 19:07) (57 - 89)  BP: 127/74 (29 Jan 2025 19:07) (127/74 - 135/83)  BP(mean): --  RR: 18 (29 Jan 2025 19:07) (16 - 18)  SpO2: 96% (29 Jan 2025 19:07) (96% - 99%)    Parameters below as of 29 Jan 2025 19:07  Patient On (Oxygen Delivery Method): room air                  11.0   10.53 )-----------( 215      ( 29 Jan 2025 19:30 )             32.9       PHYSICAL EXAM:  Constitutional: Alert, awake  Sensation grossly intact to the lower extremities. Diminished bilaterally to the toes   Spine: TTP diffusely down thoracic and lumbar paraspinal muscles.          Motor exam:          Lower extremity                      HF(l2)   KE(l3)    TA(l4)   EHL(l5)  GS(s1)                                                 R        5/5        5/5        5/5       5/5         5/5                                               L         5/5        5/5       5/5       5/5          5/5                                                 B/L LE: warm well perfused; capillary refill <3 seconds. BCR. Calves soft NT  < from: CT Lumbar Spine Reform No Cont (01.29.25 @ 21:10) >  ACC: [id number] EXAM:  CT REFORM SPINE T   ORDERED BY: ENRRIQUE PATEL   ACC: [id number] EXAM:  CT REFORM SPINE L   ORDERED BY: ENRRIQUE PATEL   PROCEDURE DATE:  01/29/2025    INTERPRETATION:  CLINICAL INFORMATION: Back and flank pain. Status post   fall 2 days ago.  COMPARISON: None.  CONTRAST/COMPLICATIONS:  IV Contrast: IV contrast documented in unlinked concurrent exam   (accession [id number]), Omnipaque 350 (accession [id number]), NONE (accession   [id number]), NONE (accession [id number])  90 cc administered   10 cc discarded  Oral Contrast: NONE  PROCEDURE:  CT of the Chest, Abdomen and Pelvis was performed.  Cone-down reconstructions of the thoracic and lumbar spine were performed.  Sagittal and coronal reformats were performed.  FINDINGS:  CHEST:  LUNGS AND LARGE AIRWAYS: Patent central airways. Mild biapical pleural   thickening and parenchymal scarring. Mild centrilobular emphysema.  PLEURA: No pleural effusion. No pneumothorax or pneumomediastinum.  VESSELS: Within normal limits.  HEART: Heart size is normal. No pericardial effusion.  MEDIASTINUM AND HUMBERTO: Shotty right hilar lymph node. There is no   mediastinal or left hilar adenopathy.  CHEST WALL AND LOWER NECK: Within normal limits.  ABDOMEN AND PELVIS:  LIVER: There is geographic fatty deposition in the left lobe of the liver   adjacent to the falciform ligament. Otherwise, within normal limits. The   hepatic veins and portal vein are patent.  BILE DUCTS: Normal caliber.  GALLBLADDER: Within normal limits.  SPLEEN: Within normal limits.  PANCREAS: Within normal limits.  ADRENALS: Within normal limits.  KIDNEYS/URETERS: Within normal limits.  BLADDER: Within normal limits.  REPRODUCTIVE ORGANS: 3 cm right ovarian cyst. The uterus and left adnexa   are unremarkable. Trace free fluid in the cul-de-sac.  BOWEL: Status post Billroth II. Sigmoid diverticulosis. No bowel   obstruction. Appendix is normal.  PERITONEUM/RETROPERITONEUM: Within normal limits.  VESSELS: Within normal limits.  LYMPH NODES: No lymphadenopathy.  ABDOMINAL WALL: Within normal limits.  BONES: There is a mild compression deformity ofL5 with a nondisplaced   vertically oriented fracture of the anterior body. There is a mild   compression deformity of the superior L2 vertebral body with 2 mm   retropulsion. There is a chronic mild compression deformity of the   superior endplate of T7. There is mild anterior endplate spurring at   T3-T4. Trace anterolisthesis of L4 on L5. Degenerative disc space   narrowing at L5-S1.  IMPRESSION: Acute compression deformities of L2 and L5 as described.  --- End of Report ---  JUDY WHITFIELD MD; Attending Radiologist  This document has been electronically signed. Jan 29 2025  9:22PM  < end of copied text >      A/P :  59y Female with acute lower back pain s/p fall with acute lumbar compression fractures   -    Official plan pending discussion with Dr. Cummings   -    MRI of head ordered for dizziness - add on MRI of thoracic and lumbar spine as well   -    Pain control; Toradol and decadron   -    WBAT of bilateral lower extremities    CHARLES DENIS  [id number]    SUBJECTIVE: Patient is a 59y Female with a PMHx chronic lower back pain presenting to the emergency department with worsening lower back pain. Patient fell 2 days ago while at home due to dizziness and nausea. Patient fell backwards and had significant lower back pain after fall. Patient has been able to ambulate since fall but it has been very painful to do so. Patient denies saddle numbness, urinary retention or difficulty controlling bowels/bladder. Patient admits to numbness of all toes at baseline and intermittent swelling of the lower extremities. Patient sees a doctor outpatient who gives her epidural and trigger point injections that help with back pain sometimes but not all the time. No other orthopedic complaints at this time.     REVIEW OF SYSTEM  General: Alert, oriented, and responsive   MSK: See HPI   Neuro: No new sensory or motor changes     OBJECTIVE:   Vital Signs Last 24 Hrs  T(C): 36.4 (29 Jan 2025 19:07), Max: 36.9 (29 Jan 2025 17:32)  T(F): 97.6 (29 Jan 2025 19:07), Max: 98.5 (29 Jan 2025 17:32)  HR: 57 (29 Jan 2025 19:07) (57 - 89)  BP: 127/74 (29 Jan 2025 19:07) (127/74 - 135/83)  BP(mean): --  RR: 18 (29 Jan 2025 19:07) (16 - 18)  SpO2: 96% (29 Jan 2025 19:07) (96% - 99%)    Parameters below as of 29 Jan 2025 19:07  Patient On (Oxygen Delivery Method): room air                  11.0   10.53 )-----------( 215      ( 29 Jan 2025 19:30 )             32.9       PHYSICAL EXAM:  Constitutional: Alert, awake  Sensation grossly intact to the lower extremities. Diminished bilaterally to the toes   Spine: TTP diffusely down thoracic and lumbar paraspinal muscles.          Motor exam:          Lower extremity                      HF(l2)   KE(l3)    TA(l4)   EHL(l5)  GS(s1)                                                 R        5/5        5/5        5/5       5/5         5/5                                               L         5/5        5/5       5/5       5/5          5/5                                                 B/L LE: warm well perfused; capillary refill <3 seconds. BCR. Calves soft NT  < from: CT Lumbar Spine Reform No Cont (01.29.25 @ 21:10) >  ACC: [id number] EXAM:  CT REFORM SPINE T   ORDERED BY: ENRRIQUE PATEL   ACC: [id number] EXAM:  CT REFORM SPINE L   ORDERED BY: ENRRIQUE PATEL   PROCEDURE DATE:  01/29/2025    INTERPRETATION:  CLINICAL INFORMATION: Back and flank pain. Status post   fall 2 days ago.  COMPARISON: None.  CONTRAST/COMPLICATIONS:  IV Contrast: IV contrast documented in unlinked concurrent exam   (accession [id number]), Omnipaque 350 (accession [id number]), NONE (accession   [id number]), NONE (accession [id number])  90 cc administered   10 cc discarded  Oral Contrast: NONE  PROCEDURE:  CT of the Chest, Abdomen and Pelvis was performed.  Cone-down reconstructions of the thoracic and lumbar spine were performed.  Sagittal and coronal reformats were performed.  FINDINGS:  CHEST:  LUNGS AND LARGE AIRWAYS: Patent central airways. Mild biapical pleural   thickening and parenchymal scarring. Mild centrilobular emphysema.  PLEURA: No pleural effusion. No pneumothorax or pneumomediastinum.  VESSELS: Within normal limits.  HEART: Heart size is normal. No pericardial effusion.  MEDIASTINUM AND HUMBERTO: Shotty right hilar lymph node. There is no   mediastinal or left hilar adenopathy.  CHEST WALL AND LOWER NECK: Within normal limits.  ABDOMEN AND PELVIS:  LIVER: There is geographic fatty deposition in the left lobe of the liver   adjacent to the falciform ligament. Otherwise, within normal limits. The   hepatic veins and portal vein are patent.  BILE DUCTS: Normal caliber.  GALLBLADDER: Within normal limits.  SPLEEN: Within normal limits.  PANCREAS: Within normal limits.  ADRENALS: Within normal limits.  KIDNEYS/URETERS: Within normal limits.  BLADDER: Within normal limits.  REPRODUCTIVE ORGANS: 3 cm right ovarian cyst. The uterus and left adnexa   are unremarkable. Trace free fluid in the cul-de-sac.  BOWEL: Status post Billroth II. Sigmoid diverticulosis. No bowel   obstruction. Appendix is normal.  PERITONEUM/RETROPERITONEUM: Within normal limits.  VESSELS: Within normal limits.  LYMPH NODES: No lymphadenopathy.  ABDOMINAL WALL: Within normal limits.  BONES: There is a mild compression deformity ofL5 with a nondisplaced   vertically oriented fracture of the anterior body. There is a mild   compression deformity of the superior L2 vertebral body with 2 mm   retropulsion. There is a chronic mild compression deformity of the   superior endplate of T7. There is mild anterior endplate spurring at   T3-T4. Trace anterolisthesis of L4 on L5. Degenerative disc space   narrowing at L5-S1.  IMPRESSION: Acute compression deformities of L2 and L5 as described.  --- End of Report ---  JUDY WHITFIELD MD; Attending Radiologist  This document has been electronically signed. Jan 29 2025  9:22PM  < end of copied text >      A/P :  59y Female with acute lower back pain s/p fall with acute lumbar compression fractures   -    Official plan pending discussion with Dr. Cummings   -    MRI of head ordered for dizziness - add on MRI of thoracic and lumbar spine as well   -    Pain control; Toradol and decadron   -    WBAT of bilateral lower extremities     -----    Update (1/30/2025):  -Pain control/   -Imaging reviewed by attending.  -WBAT.  -LSO ordered.  -PT eval.  -No acute orthopedic surgical intervention planned at this time.

## 2025-01-29 NOTE — ED PROVIDER NOTE - PHYSICAL EXAMINATION
Gen: Alert, NAD  Head: NC, AT, PERRL, EOMI, normal lids/conjunctiva  Neck: +supple, no tenderness/meningismus/JVD, +Trachea midline  Pulm: Bilateral BS, normal resp effort, no wheeze/stridor/retractions  CV: RRR, no M/R/G, +dist pulses  Abd: soft, NT/ND, +BS, no hepatosplenomegaly  Mskel:    L UE: from/nt @shoulder/elbow/wrist/hand   R UE: from/nt @shoulder/elbow/wrist/hand   L LE: from/nt @ hip/knee/ankle   R LE: from/nt @hip/knee/ankle   distal pulses intact  BACK: nt midline c. ttp @ T7/8.  ttp @ L3/4.   Neuro: AAOx3, no sensory/motor deficit

## 2025-01-29 NOTE — CONSULT NOTE ADULT - NS ATTEND AMEND GEN_ALL_CORE FT
Orthopaedic Spine/Trauma Addendum:    I have personally reviewed the patients chart, imaging and lab results. I have reviewed the physician assistant note and agree with the history, exam, and plan of care, except as noted.    Ash Cummings DO  Orthopaedic Spine/Trauma Surgeon  [facility]

## 2025-01-29 NOTE — ED PROVIDER NOTE - NS ED ROS FT
Constitutional: (-) fever  (-)chills  (-)sweats  Eyes/ENT: (-)   Cardiovascular: (-) chest pain, (-) palpitations (-) edema   Respiratory: (-) cough, (-) shortness of breath   Gastrointestinal: (+)nausea  (+)vomiting, (-) diarrhea  (-) abdominal pain   :  (-)dysuria, (-)frequency, (-)urgency, (-)hematuria  Musculoskeletal: (-) neck pain, (+) back pain, (+) joint pain  Integumentary: (-) rash, (-) edema  Neurological: (+) headache, (-) altered mental status  (-)LOC

## 2025-01-30 VITALS
RESPIRATION RATE: 17 BRPM | TEMPERATURE: 99 F | HEART RATE: 72 BPM | DIASTOLIC BLOOD PRESSURE: 70 MMHG | SYSTOLIC BLOOD PRESSURE: 148 MMHG | OXYGEN SATURATION: 96 %

## 2025-01-30 PROCEDURE — 96374 THER/PROPH/DIAG INJ IV PUSH: CPT | Mod: XU

## 2025-01-30 PROCEDURE — 99239 HOSP IP/OBS DSCHRG MGMT >30: CPT

## 2025-01-30 PROCEDURE — 96375 TX/PRO/DX INJ NEW DRUG ADDON: CPT | Mod: XU

## 2025-01-30 PROCEDURE — 85610 PROTHROMBIN TIME: CPT

## 2025-01-30 PROCEDURE — 72141 MRI NECK SPINE W/O DYE: CPT | Mod: MC

## 2025-01-30 PROCEDURE — 86850 RBC ANTIBODY SCREEN: CPT

## 2025-01-30 PROCEDURE — 72141 MRI NECK SPINE W/O DYE: CPT | Mod: 26

## 2025-01-30 PROCEDURE — 71260 CT THORAX DX C+: CPT | Mod: MC

## 2025-01-30 PROCEDURE — 70551 MRI BRAIN STEM W/O DYE: CPT | Mod: MC

## 2025-01-30 PROCEDURE — 85025 COMPLETE CBC W/AUTO DIFF WBC: CPT

## 2025-01-30 PROCEDURE — 72146 MRI CHEST SPINE W/O DYE: CPT | Mod: 26

## 2025-01-30 PROCEDURE — G0378: CPT

## 2025-01-30 PROCEDURE — 80053 COMPREHEN METABOLIC PANEL: CPT

## 2025-01-30 PROCEDURE — 72148 MRI LUMBAR SPINE W/O DYE: CPT | Mod: 26

## 2025-01-30 PROCEDURE — 86901 BLOOD TYPING SEROLOGIC RH(D): CPT

## 2025-01-30 PROCEDURE — 96376 TX/PRO/DX INJ SAME DRUG ADON: CPT | Mod: XU

## 2025-01-30 PROCEDURE — 70450 CT HEAD/BRAIN W/O DYE: CPT | Mod: MC

## 2025-01-30 PROCEDURE — 73562 X-RAY EXAM OF KNEE 3: CPT

## 2025-01-30 PROCEDURE — 85730 THROMBOPLASTIN TIME PARTIAL: CPT

## 2025-01-30 PROCEDURE — 74177 CT ABD & PELVIS W/CONTRAST: CPT | Mod: MC

## 2025-01-30 PROCEDURE — 72125 CT NECK SPINE W/O DYE: CPT | Mod: MC

## 2025-01-30 PROCEDURE — 36415 COLL VENOUS BLD VENIPUNCTURE: CPT

## 2025-01-30 PROCEDURE — 72148 MRI LUMBAR SPINE W/O DYE: CPT | Mod: MC

## 2025-01-30 PROCEDURE — 86900 BLOOD TYPING SEROLOGIC ABO: CPT

## 2025-01-30 PROCEDURE — 99284 EMERGENCY DEPT VISIT MOD MDM: CPT | Mod: 25

## 2025-01-30 PROCEDURE — 72146 MRI CHEST SPINE W/O DYE: CPT | Mod: MC

## 2025-01-30 PROCEDURE — 70551 MRI BRAIN STEM W/O DYE: CPT | Mod: 26

## 2025-01-30 RX ORDER — OXYCODONE HYDROCHLORIDE 30 MG/1
10 TABLET ORAL ONCE
Refills: 0 | Status: DISCONTINUED | OUTPATIENT
Start: 2025-01-30 | End: 2025-01-30

## 2025-01-30 RX ORDER — DIAZEPAM 5 MG
1 TABLET ORAL
Qty: 9 | Refills: 0
Start: 2025-01-30 | End: 2025-02-01

## 2025-01-30 RX ORDER — ESCITALOPRAM 10 MG/1
20 TABLET, FILM COATED ORAL DAILY
Refills: 0 | Status: ACTIVE | OUTPATIENT
Start: 2025-01-30 | End: 2025-12-29

## 2025-01-30 RX ORDER — DIAZEPAM 5 MG
5 TABLET ORAL DAILY
Refills: 0 | Status: COMPLETED | OUTPATIENT
Start: 2025-01-30 | End: 2025-02-06

## 2025-01-30 RX ORDER — ACETAMINOPHEN 160 MG/5ML
2 SUSPENSION ORAL
Qty: 30 | Refills: 0
Start: 2025-01-30 | End: 2025-02-03

## 2025-01-30 RX ORDER — BACTERIOSTATIC SODIUM CHLORIDE 0.9 %
1000 VIAL (ML) INJECTION
Refills: 0 | Status: ACTIVE | OUTPATIENT
Start: 2025-01-30 | End: 2025-12-29

## 2025-01-30 RX ORDER — OXYCODONE HYDROCHLORIDE 30 MG/1
1 TABLET ORAL
Qty: 9 | Refills: 0
Start: 2025-01-30 | End: 2025-02-01

## 2025-01-30 RX ORDER — DIAZEPAM 5 MG
5 TABLET ORAL ONCE
Refills: 0 | Status: DISCONTINUED | OUTPATIENT
Start: 2025-01-30 | End: 2025-01-30

## 2025-01-30 RX ORDER — ONDANSETRON 4 MG/1
4 TABLET, ORALLY DISINTEGRATING ORAL EVERY 12 HOURS
Refills: 0 | Status: ACTIVE | OUTPATIENT
Start: 2025-01-30 | End: 2025-12-29

## 2025-01-30 RX ORDER — PANTOPRAZOLE 20 MG/1
40 TABLET, DELAYED RELEASE ORAL
Refills: 0 | Status: ACTIVE | OUTPATIENT
Start: 2025-01-30 | End: 2025-12-29

## 2025-01-30 RX ORDER — LIDOCAINE HYDROCHLORIDE 30 MG/G
1 CREAM TOPICAL
Qty: 4 | Refills: 0
Start: 2025-01-30 | End: 2025-02-03

## 2025-01-30 RX ORDER — IBUPROFEN 600 MG/1
1 TABLET, FILM COATED ORAL
Qty: 20 | Refills: 0
Start: 2025-01-30 | End: 2025-02-03

## 2025-01-30 RX ADMIN — Medication 125 MILLILITER(S): at 10:17

## 2025-01-30 RX ADMIN — DEXAMETHASONE SODIUM PHOSPHATE 6 MILLIGRAM(S): 4 INJECTION, SOLUTION INTRA-ARTICULAR; INTRALESIONAL; INTRAMUSCULAR; INTRAVENOUS; SOFT TISSUE at 00:16

## 2025-01-30 RX ADMIN — Medication 5 MILLIGRAM(S): at 07:43

## 2025-01-30 RX ADMIN — Medication 5 MILLIGRAM(S): at 00:46

## 2025-01-30 RX ADMIN — PANTOPRAZOLE 40 MILLIGRAM(S): 20 TABLET, DELAYED RELEASE ORAL at 07:02

## 2025-01-30 RX ADMIN — OXYCODONE HYDROCHLORIDE 10 MILLIGRAM(S): 30 TABLET ORAL at 00:46

## 2025-01-30 RX ADMIN — ESCITALOPRAM 20 MILLIGRAM(S): 10 TABLET, FILM COATED ORAL at 11:22

## 2025-01-30 RX ADMIN — DEXAMETHASONE SODIUM PHOSPHATE 6 MILLIGRAM(S): 4 INJECTION, SOLUTION INTRA-ARTICULAR; INTRALESIONAL; INTRAMUSCULAR; INTRAVENOUS; SOFT TISSUE at 05:07

## 2025-01-30 RX ADMIN — Medication 15 MILLIGRAM(S): at 00:16

## 2025-01-30 RX ADMIN — ONDANSETRON 4 MILLIGRAM(S): 4 TABLET, ORALLY DISINTEGRATING ORAL at 12:08

## 2025-01-30 NOTE — CHART NOTE - NSCHARTNOTEFT_GEN_A_CORE
Patient was measured fit and delivered a LSO with rigid anterior posterior and lateral panels. The orthosis will stabilize and control the lumbar spine reduce pain and ROM, and safely protect the fracture site. Care use and function were explained. Contact info was provided. All went without incident.   [facility]  [phone]

## 2025-01-30 NOTE — ED CDU PROVIDER DISPOSITION NOTE - ATTENDING CONTRIBUTION TO CARE
59-year-old female history of depression GERD had presented to the emergency department for back pain after a fall more than 2 days ago, MRI showing L2 and L5 compression fractures with reported ligamentous injury. Fitted for brace, outpt f/u

## 2025-01-30 NOTE — ED CDU PROVIDER DISPOSITION NOTE - SPECIALTY CARE
I can not fill until I see him as we've never written this med for him, I would have them have [facility]gado refill until his appt with us Orthopedic Surgery

## 2025-01-30 NOTE — ED ADULT NURSE REASSESSMENT NOTE - NS ED NURSE REASSESS COMMENT FT1
Assumed care from SYLVAIN OLIVO at 0730, patient A&Ox4 resting in bed, neuro assessment WNL, patient verbalized partial improvement in back pain rated 6/10 at this time, vitals stable, respirations even and unlabored on RA,  Patient feeling anxious, TIANA metz made aware, patient given PO Diazepam, transportation at bedside to take the patient for MRI.

## 2025-01-30 NOTE — ED CDU PROVIDER DISPOSITION NOTE - NSFOLLOWUPINSTRUCTIONS_ED_ALL_ED_FT
please follow with outpatient SPINE as well as primary medical clinician   please bring all reports with your for follow up   you have been prescribed oxycodone, valium tylenol and ibuprofen for pain relief   - please do not drive, drink alcohol, or operate machinery while taking the oxycodone or valium  please review all Side effects on prescription bottle.     we up and walking please wear brace       Back Pain    Back pain is very common in adults. The cause of back pain is rarely dangerous and the pain often gets better over time. The cause of your back pain may not be known and may include strain of muscles or ligaments, degeneration of the spinal disks, or arthritis. Occasionally the pain may radiate down your leg(s). Over-the-counter medicines to reduce pain and inflammation are often the most helpful. Stretching and remaining active frequently helps the healing process.     SEEK IMMEDIATE MEDICAL CARE IF YOU HAVE ANY OF THE FOLLOWING SYMPTOMS: bowel or bladder control problems, unusual weakness or numbness in your arms or legs, nausea or vomiting, abdominal pain, fever, dizziness/lightheadedness.    Fracture    A fracture is a break in one of your bones. This can occur from a variety of injuries, especially traumatic ones. Symptoms include pain, bruising, or swelling. Do not use the injured limb. If a fracture is in one of the bones below your waist, do not put weight on that limb unless instructed to do so by your healthcare provider. Crutches or a cane may have been provided. A splint or cast may have been applied by your health care provider. Make sure to keep it dry and follow up with an orthopedist as instructed.    SEEK IMMEDIATE MEDICAL CARE IF YOU HAVE ANY OF THE FOLLOWING SYMPTOMS: numbness, tingling, increasing pain, or weakness in any part of the injured limb.

## 2025-01-30 NOTE — ED CDU PROVIDER DISPOSITION NOTE - CARE PROVIDER_API CALL
Ash Cummings  Spine Surgery  [address] [place] [id number]  Phone: [phone]  Fax: [phone]  Follow Up Time: Urgent

## 2025-01-30 NOTE — ED CDU PROVIDER INITIAL DAY NOTE - OBJECTIVE STATEMENT
59 year old female PMhx depression, GERD presented to ED for back pain s/p fall. Pt reports episodic dizziness two days ago with fall, landing on back. increasing pain x 2 days. No redflags. CT+ acute L2+L5 compression fracture. Ortho consulted recommending MR lumbar. Pt without dizziness, neurologically intact. MR head placed by ED physician.

## 2025-01-30 NOTE — ED ADULT NURSE REASSESSMENT NOTE - NS ED NURSE REASSESS COMMENT FT1
report received from Rn erasmo hines. Pt A&OX4. resp even and unlabored on RA. Pt medicated per EMR. update don plan of care. neuro status reassessed. Pt pending mri

## 2025-01-30 NOTE — ED CDU PROVIDER INITIAL DAY NOTE - ATTENDING APP SHARED VISIT CONTRIBUTION OF CARE
I, Micheal Ann, performed a face to face bedside interview with this patient regarding history of present illness, and completed an independent physical examination. I personally made/approved the management plan and take responsibility for the patient management. I have communicated the patient’s plan of care and disposition with the ACP.  Pt placed on ob for dizziness and lumbar fractures pending MRI  Gen: NAD, well appearing  CV: RRR  Pul: CTA b/l  Abd: Soft, non-distended, non-tender  Neuro: no focal deficits

## 2025-01-30 NOTE — ED CDU PROVIDER INITIAL DAY NOTE - PHYSICAL EXAMINATION
Gen: No acute distress, non toxic  HEENT: Mucous membranes moist, pink conjunctivae, EOMI. PERRL. Airway patent.   CV: RRR, nl s1/s2.  Resp: CTAB, normal rate and effort. No wheezes, rhonchi, or crackles.   GI: Abdomen soft, NT, ND. No rebound, no guarding  Neuro: A&O x4, MAEx4. 5/5 str ext x 4. Sensation intact, symmetric throughout. No FND's. Gait intact. CN 2-12 intact.   MSK: TTP @ L2, L5. No visualized or palpable deformities.  Skin: No rashes, skin intact and well perfused. Cap refill <2sec  Vascular: Radial and dorsalis pedal pulses 2+ b/l

## 2025-01-30 NOTE — ED ADULT NURSE REASSESSMENT NOTE - NS ED NURSE REASSESS COMMENT FT1
Patient A&ox4 resting in bed, vitals stable, respirations even and unlabored on RA, awaiting orthopedist to bring a back brace, will assess ambulation with the brace, patient aware of POC, NS running at 125ml/hr,m safety measures in place.

## 2025-01-30 NOTE — ED CDU PROVIDER DISPOSITION NOTE - CLINICAL COURSE
59-year-old female history of depression GERD had presented to the emergency department for back pain after a fall more than 2 days ago after episode of dizziness then landing on her back found to have acute L2 L5 compression fractures orthopedic spine consulted with recommendations for MR and pain control.  Patient underwent MR imaging of which no acute infarct noted on MR head redemonstration of L2 and L5 compression fractures with reported ligamentous injury.  Results reviewed by orthopedic spine team and patient ordered for brace of which was fitted by the orthotist, patient ambulatory with stable gait at bedside with brace in place.  Patient reporting improved pain after receiving oral Valium for MR of which was then sent to her pharmacy of choice.  Advised on outpatient follow-up with orthopedic spine team for further medical management, return precautions advised.

## 2025-01-30 NOTE — ED CDU PROVIDER DISPOSITION NOTE - PATIENT PORTAL LINK FT
You can access the FollowMyHealth Patient Portal offered by [facility] by registering at the following website: [email or web address]. By joining [facility]’s FollowMyHealth portal, you will also be able to view your health information using other applications (apps) compatible with our system.

## 2025-01-30 NOTE — ED CDU PROVIDER INITIAL DAY NOTE - TEMPLATE, MLM
"Shena Lane is a 69 y.o. 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 Jose Riley) who presented 4/15/2022 with complaints of chest pain back pain.  Patient stated she was seeing her homeopathic doctor who had ordered IV vitamin C infusion.  This was her first time having an IV infusion of vitamin C.  During the therapy, the patient reported a sudden onset of 10 out of 10 chest pain and her homeopath suggested that she come to the ED.      Work-up in the ED revealed negative troponin, normal sinus rhythm on EKG, and potassium of 2.6.  Patient was admitted for further management of her hypokalemia and to trend troponins    Her hypokalemia persisted and she was given PO/IV supplementation. Additionally, patient reports that she takes \"a lot\" of supplements recommended by her homeopathic doctor, raising suspicion that these may be contributing to her hypokalemia. Her  also follows the philosophy of Nura Hudson, a cancer research who sells supplements. The patient's  brought the bags of supplements in and they were looked over by the hospitalist APRN and pharmacist. No obvious contributing source to the hypokalemia was noted in the provided bags of supplements. The patient and her  were advised that the hypokalemia is likely due to her disease process and to avoid high dose vitamin C infusions. Additionally, they were advised to discuss further choice of supplementation with her oncologist. Echocardiogram completed while inpatient demonstrated     Although am labs were ordered, the potassium redraw did not reoccur until the afternoon of April 17. Repeat was 4.1. Patient has been advised that she will need to take 20 mg PO daily of potassium supplementation and arrange close follow up with her primary care/oncology physician. Patient refused to complete POLST prior to discharge and advised that she will " fax her advanced care documents to the palliative care RN. Home health has been arranged and will be reaching out to patient to arrange visit times. Plan of are discussed with patient and her  and all questions were answered.    General

## 2025-02-03 RX ORDER — METHYLPREDNISOLONE ACETATE 40 MG/ML
1 VIAL (ML) INJECTION
Qty: 1 | Refills: 0
Start: 2025-02-03

## 2025-02-03 NOTE — ED POST DISCHARGE NOTE - ADDITIONAL DOCUMENTATION
Patient called request steroids medication that help her with the back injury. that helped her a lot Medrol Dosepak sent to pharmacy for the patient as per request

## 2025-02-05 ENCOUNTER — APPOINTMENT (OUTPATIENT)
Dept: MRI IMAGING | Facility: CLINIC | Age: 60
End: 2025-02-05

## 2025-02-05 ENCOUNTER — APPOINTMENT (OUTPATIENT)
Dept: ORTHOPEDIC SURGERY | Facility: CLINIC | Age: 60
End: 2025-02-05

## 2025-02-06 ENCOUNTER — APPOINTMENT (OUTPATIENT)
Dept: ORTHOPEDIC SURGERY | Facility: CLINIC | Age: 60
End: 2025-02-06
Payer: COMMERCIAL

## 2025-02-06 VITALS
DIASTOLIC BLOOD PRESSURE: 84 MMHG | WEIGHT: 116 LBS | HEART RATE: 98 BPM | BODY MASS INDEX: 21.9 KG/M2 | HEIGHT: 61 IN | SYSTOLIC BLOOD PRESSURE: 116 MMHG

## 2025-02-06 DIAGNOSIS — S32.050A WEDGE COMPRESSION FRACTURE OF FIFTH LUMBAR VERTEBRA, INITIAL ENCOUNTER FOR CLOSED FRACTURE: ICD-10-CM

## 2025-02-06 DIAGNOSIS — S32.020A WEDGE COMPRESSION FRACTURE OF SECOND LUMBAR VERTEBRA, INITIAL ENCOUNTER FOR CLOSED FRACTURE: ICD-10-CM

## 2025-02-06 DIAGNOSIS — M43.10 SPONDYLOLISTHESIS, SITE UNSPECIFIED: ICD-10-CM

## 2025-02-06 PROCEDURE — 99024 POSTOP FOLLOW-UP VISIT: CPT

## 2025-02-06 RX ORDER — CARISOPRODOL 350 MG/1
350 TABLET ORAL 3 TIMES DAILY
Qty: 21 | Refills: 0 | Status: ACTIVE | COMMUNITY
Start: 2025-02-06 | End: 1900-01-01

## 2025-02-11 PROBLEM — S32.050A COMPRESSION FRACTURE OF L5 VERTEBRA, INITIAL ENCOUNTER: Status: ACTIVE | Noted: 2025-02-11

## 2025-02-15 ENCOUNTER — NON-APPOINTMENT (OUTPATIENT)
Age: 60
End: 2025-02-15

## 2025-02-26 NOTE — DISCHARGE NOTE PROVIDER - NSDCCPTREATMENT_GEN_ALL_CORE_FT
JALEESA RAMIRES is a 66y Male s/p DDRT on 1958. PMH is significant for DM (retinopathy,neuropathy), HTN, anemia, chronic CHF, TTE w/ grade II diastolic dysfunction mild/mod MR and mild TR (2023), HLD, and CKD ESRD on HD since Jan 2023     NKDA    CMV+/+  EBV+/+    Transplant Medications  Induction  -Thymoglobulin 1.5 mg/kg/dose x 4 doses (cumulative dose of 6 mg/kg)        Premedicate one hour prior to administration with                -Acetaminophen 650 mg PO/NJ                -Diphenhydramine 50 mg IV/PO                -Steroid taper        Adjust dose for WBC < 3 or PLT < 75  -Methylprednisolone taper (switch to PO prednisone on POD 4)            POD 0: 500 mg IV in OR            POD 1: 125 mg IV Q12H            POD 2: 60 mg IV Q12H            POD 3: 30 mg IV Q12H        Maintenance Immunosuppression  -Tacrolimus (Envarsus) 0.14 mg/kg daily (Adjust for goal trough: 8-10)  -Mycophenolate 1,000 mg PO Q12H  -Prednisone             POD 4: 20 mg PO Q12H            POD 5: 10 mg PO Q12H            POD 6: 5 mg PO Q12H            POD 7: 5 mg daily     Anti-infection   -Bactrim SS tablet (frequency based on renal function)  -Valganciclovir (dose based on CMV serostatus and frequency based on renal function)  -Nystatin swish and swallow 5 mL four times daily    Surgical prophylaxis pre- and intra-operative dosing  -Cefazolin    Prophylaxis  -GI ppx: famotidine 20 mg daily  -Bowel ppx: senna  -DVT: sequential compression device  -Pain:            Mild: Acetaminophen 650 mg every 6 hours PRN           Moderate: Tramadol 25 mg every 4 hours PRN (adjust for renal function)           Severe: Tramadol 50 mg every 4 hours PRN (adjust for renal function)    Home Medications:  atenolol 25 mg oral tablet: 1 tab(s) orally once a day (04 Jan 2024 11:55)  hydralazine 100 mg tid    Outpatient medication reconciliation reviewed and will be re-started appropriately.  Plan discussed with multidisciplinary team.      PRINCIPAL PROCEDURE  Procedure: TKA (total knee arthroplasty)  Findings and Treatment:

## 2025-03-01 ENCOUNTER — EMERGENCY (EMERGENCY)
Facility: HOSPITAL | Age: 60
LOS: 1 days | Discharge: HOME | End: 2025-03-01
Attending: EMERGENCY MEDICINE
Payer: COMMERCIAL

## 2025-03-01 VITALS
SYSTOLIC BLOOD PRESSURE: 116 MMHG | TEMPERATURE: 98 F | HEART RATE: 96 BPM | DIASTOLIC BLOOD PRESSURE: 51 MMHG | WEIGHT: 114.64 LBS | HEIGHT: 61 IN | RESPIRATION RATE: 16 BRPM | OXYGEN SATURATION: 96 %

## 2025-03-01 DIAGNOSIS — Z98.891 HISTORY OF UTERINE SCAR FROM PREVIOUS SURGERY: Chronic | ICD-10-CM

## 2025-03-01 DIAGNOSIS — Z96.652 PRESENCE OF LEFT ARTIFICIAL KNEE JOINT: Chronic | ICD-10-CM

## 2025-03-01 DIAGNOSIS — Z90.3 ACQUIRED ABSENCE OF STOMACH [PART OF]: Chronic | ICD-10-CM

## 2025-03-01 PROCEDURE — 70450 CT HEAD/BRAIN W/O DYE: CPT | Mod: MC

## 2025-03-01 PROCEDURE — 70450 CT HEAD/BRAIN W/O DYE: CPT | Mod: 26

## 2025-03-01 PROCEDURE — 99284 EMERGENCY DEPT VISIT MOD MDM: CPT | Mod: 25

## 2025-03-01 PROCEDURE — 99284 EMERGENCY DEPT VISIT MOD MDM: CPT

## 2025-03-01 NOTE — ED PROVIDER NOTE - CLINICAL SUMMARY MEDICAL DECISION MAKING FREE TEXT BOX
58yo F PMHx MDD, GERD, osteoarthritis, left knee TKR, peripheral neuropathy, recent L2 and L5 fx presents to ED for suture removal. Sutures removed in ED without complication. Pt states she feels well, however pt has not been able to sleep lately. Pt with some confused speech in ED however pt fully neurointact. CT head ordered. 58yo F PMHx MDD, GERD, osteoarthritis, left knee TKR, peripheral neuropathy, recent L2 and L5 fx presents to ED for suture removal. Sutures removed in ED without complication. Pt states she feels well, however pt has not been able to sleep lately. Pt with some confused speech in ED however pt fully neurointact, A&Ox3. CT head no acute intracranial hemorrhage, mass effect, or midline shift. 60yo F PMHx MDD, GERD, osteoarthritis, left knee TKR, peripheral neuropathy, recent L2 and L5 fx presents to ED for suture removal. Sutures removed in ED without complication. Pt states she feels well, however pt has not been able to sleep lately. Pt with occasional confused speech in ED however pt fully neurointact, A&Ox3, acting appropriately for age. CT head showing no acute intracranial hemorrhage, mass effect, or midline shift. Pt to be discharged. Return precautions discussed with patient.

## 2025-03-01 NOTE — ED PROVIDER NOTE - PATIENT PORTAL LINK FT
You can access the FollowMyHealth Patient Portal offered by [facility] by registering at the following website: [email or web address]. By joining [facility]’s FollowMyHealth portal, you will also be able to view your health information using other applications (apps) compatible with our system.

## 2025-03-01 NOTE — ED PROVIDER NOTE - ATTENDING APP SHARED VISIT CONTRIBUTION OF CARE
head injury with possible post concussive symptoms; normal speech, no neuro deficits; clear and coherent on my eval; wound to forehead clean/dry and intact, no sign of infection; imaging without acute findings; agree with acp plan of care    This was a shared visit with KEVIN. I reviewed and verified the documentation and independently performed the documented history/exam/mdm.

## 2025-03-01 NOTE — ED ADULT NURSE NOTE - NSFALLUNIVINTERV_ED_ALL_ED
Bed/Stretcher in lowest position, wheels locked, appropriate side rails in place/Call bell, personal items and telephone in reach/Instruct patient to call for assistance before getting out of bed/chair/stretcher/Non-slip footwear applied when patient is off stretcher/[place] to call system/Physically safe environment - no spills, clutter or unnecessary equipment/Purposeful proactive rounding/Room/bathroom lighting operational, light cord in reach

## 2025-03-01 NOTE — ED ADULT NURSE NOTE - CAS EDP DISCH TYPE
CONSTITUTIONAL: Well appearing, awake, alert, oriented to person, place, time/situation and in no apparent distress.  · ENMT: Airway patent, Nasal mucosa clear. Mouth with normal mucosa. Throat has no vesicles, no oropharyngeal exudates and uvula is midline.  · EYES: Clear bilaterally, pupils equal, round and reactive to light.  · CARDIAC: Normal rate, regular rhythm.  Heart sounds S1, S2.  No murmurs, rubs or gallops.  · RESPIRATORY: Breath sounds clear and equal bilaterally.  · GASTROINTESTINAL: Abdomen soft, non-tender, no guarding.  · MUSCULOSKELETAL: Spine appears normal, range of motion is not limited,   Mild tenderness to palpation of the medial aspect of the left upper extremity just proximal to the elbow without significant erythema or induration  · NEUROLOGICAL: Alert and oriented, no focal deficits, no motor or sensory deficits.  · SKIN: Skin normal color for race, warm, dry and intact. No evidence of rash Home

## 2025-03-01 NOTE — ED PROVIDER NOTE - OBJECTIVE STATEMENT
60yo F PMHx MDD, GERD, osteoarthritis, left knee TKR, peripheral neuropathy, recent L2 and L5 fx presents to ED for suture removal. Pt states that about 2 weeks ago her dogs ran out of her house and it caused her to fall and hit her head. Pt states she has felt fine since the fall. Pt denies any headaches, dizziness, nausea, vomiting. Pt denies any fevers at home. Pt states she has been cleaning the area with water. Pt states she feels well, however pt has not been able to sleep lately. Pt with some confused speech in ED however pt fully neurointact. 58yo F PMHx MDD, GERD, osteoarthritis, left knee TKR, peripheral neuropathy, recent L2 and L5 fx presents to ED for suture removal. Pt states that about 2 weeks ago her dogs ran out of her house and it caused her to fall and hit her head. Pt states she has felt fine since the fall. Pt denies any headaches, dizziness, nausea, vomiting. Pt denies any fevers at home. Pt states she has been cleaning the area with water. Pt states she feels well, however pt has not been able to sleep lately. Pt with some confused speech in ED however pt fully neurointact, A&Ox3. 58yo F PMHx MDD, GERD, osteoarthritis, left knee TKR, peripheral neuropathy, recent L2 and L5 fx presents to ED for suture removal. Pt states that about 2 weeks ago her dogs ran out of her house and it caused her to fall and hit her head. Pt states she has felt fine since the fall. Pt denies any headaches, dizziness, nausea, vomiting. Pt denies any fevers at home. Pt states she has been cleaning the area with water. Pt states she feels well, however pt has not been able to sleep lately. Pt with occasional confused speech in ED however pt fully neurointact, A&Ox3, acting appropriately for age.

## 2025-03-01 NOTE — ED PROVIDER NOTE - PHYSICAL EXAMINATION
Gen: No acute distress, non toxic  HENT: NCAT, Mucous membranes moist, Oropharynx without exudates, uvula midline  Eyes: pink conjunctivae, EOMI, PERRL  CV: RRR, nl s1/s2.  Resp: CTAB, normal rate and effort  GI: Abdomen soft, NT, ND. No rebound, no guarding  Neuro: A&O x 3, CN II-XII intact, sensorimotor intact without deficits   MSK: No spine or joint tenderness to palpation, Full ROM ext x 4  Skin: +2cm laceration R forehead closed with sutures. No redness, no streaking, no pus, no drainage. Wound clean and dry. No signs of infection.

## 2025-03-01 NOTE — ED PROVIDER NOTE - CARE PLAN
Principal Discharge DX:	Encounter for removal of sutures   1 Principal Discharge DX:	Encounter for removal of sutures  Secondary Diagnosis:	Minor head injury

## 2025-03-01 NOTE — ED PROVIDER NOTE - NS ED ROS FT
Gen: denies fever, chills, fatigue, weight loss  Skin: +laceration R forehead  HEENT: denies visual changes, ear pain, nasal congestion, throat pain  Respiratory: denies BRADY, SOB, cough, wheezing  Cardiovascular: denies chest pain, palpitations, diaphoresis, LE edema  GI: denies abdominal pain, n/v/d  : denies dysuria, frequency, urgency, bowel/bladder incontinence  MSK: denies joint swelling/pain, back pain, neck pain  Neuro: denies headache, dizziness, weakness, numbness

## 2025-03-01 NOTE — ED ADULT TRIAGE NOTE - PRO INTERPRETER NEED 2
49 yo F with hx of postprandial abdominal pain with solid food (able to tolerate liquids) She was admitted from 5/10/21 to 5/14/21 with worsening abdominal pain and inability to tolerate solid food.  Since discharge she is tolerating liquids, but is still unable to tolerate solids.  She reports 40lb weight loss over the past 2 months.  She complains of bloating, flatus and soft bowel movements She is scheduled for robotic assisted laparoscopic median arcuate ligament release on 7/1/21. English

## 2025-03-01 NOTE — ED PROVIDER NOTE - NSFOLLOWUPINSTRUCTIONS_ED_ALL_ED_FT
Please follow up with PCP within 3 days.    Laceration    A laceration is a cut that goes through all of the layers of the skin and into the tissue that is right under the skin. Some lacerations heal on their own. Others need to be closed with skin adhesive strips, skin glue, stitches (sutures), or staples. Proper laceration care minimizes the risk of infection and helps the laceration to heal better.  If non-absorbable stitches or staples have been placed, they must be taken out within the time frame instructed by your healthcare provider.    SEEK IMMEDIATE MEDICAL CARE IF YOU HAVE ANY OF THE FOLLOWING SYMPTOMS: swelling around the wound, worsening pain, drainage from the wound, red streaking going away from your wound, inability to move finger or toe near the laceration, or discoloration of skin near the laceration.

## 2025-03-04 ENCOUNTER — APPOINTMENT (OUTPATIENT)
Dept: ORTHOPEDIC SURGERY | Facility: CLINIC | Age: 60
End: 2025-03-04
Payer: COMMERCIAL

## 2025-03-04 VITALS — WEIGHT: 116 LBS | HEIGHT: 61 IN | BODY MASS INDEX: 21.9 KG/M2

## 2025-03-04 DIAGNOSIS — S32.020A WEDGE COMPRESSION FRACTURE OF SECOND LUMBAR VERTEBRA, INITIAL ENCOUNTER FOR CLOSED FRACTURE: ICD-10-CM

## 2025-03-04 DIAGNOSIS — S32.050A WEDGE COMPRESSION FRACTURE OF FIFTH LUMBAR VERTEBRA, INITIAL ENCOUNTER FOR CLOSED FRACTURE: ICD-10-CM

## 2025-03-04 PROCEDURE — 99024 POSTOP FOLLOW-UP VISIT: CPT

## 2025-03-04 RX ORDER — TRAMADOL HYDROCHLORIDE 50 MG/1
50 TABLET, COATED ORAL 3 TIMES DAILY
Qty: 9 | Refills: 0 | Status: ACTIVE | COMMUNITY
Start: 2025-03-04 | End: 1900-01-01

## 2025-03-06 ENCOUNTER — APPOINTMENT (OUTPATIENT)
Dept: RADIOLOGY | Facility: CLINIC | Age: 60
End: 2025-03-06

## 2025-03-10 ENCOUNTER — APPOINTMENT (OUTPATIENT)
Dept: RADIOLOGY | Facility: CLINIC | Age: 60
End: 2025-03-10
Payer: COMMERCIAL

## 2025-03-10 ENCOUNTER — OUTPATIENT (OUTPATIENT)
Dept: OUTPATIENT SERVICES | Facility: HOSPITAL | Age: 60
LOS: 1 days | End: 2025-03-10
Payer: COMMERCIAL

## 2025-03-10 DIAGNOSIS — Z98.891 HISTORY OF UTERINE SCAR FROM PREVIOUS SURGERY: Chronic | ICD-10-CM

## 2025-03-10 DIAGNOSIS — Z96.652 PRESENCE OF LEFT ARTIFICIAL KNEE JOINT: Chronic | ICD-10-CM

## 2025-03-10 DIAGNOSIS — S32.050A WEDGE COMPRESSION FRACTURE OF FIFTH LUMBAR VERTEBRA, INITIAL ENCOUNTER FOR CLOSED FRACTURE: ICD-10-CM

## 2025-03-10 PROCEDURE — 77085 DXA BONE DENSITY AXL VRT FX: CPT | Mod: 26

## 2025-03-10 PROCEDURE — 77085 DXA BONE DENSITY AXL VRT FX: CPT

## 2025-03-14 ENCOUNTER — NON-APPOINTMENT (OUTPATIENT)
Age: 60
End: 2025-03-14

## 2025-03-18 ENCOUNTER — APPOINTMENT (OUTPATIENT)
Dept: ORTHOPEDIC SURGERY | Facility: CLINIC | Age: 60
End: 2025-03-18

## 2025-03-26 RX ORDER — TRAMADOL HYDROCHLORIDE 50 MG/1
50 TABLET, COATED ORAL
Qty: 9 | Refills: 0 | Status: ACTIVE | COMMUNITY
Start: 2025-03-26 | End: 1900-01-01

## 2025-03-26 RX ORDER — METHOCARBAMOL 750 MG/1
750 TABLET, FILM COATED ORAL
Qty: 21 | Refills: 0 | Status: ACTIVE | COMMUNITY
Start: 2025-03-26 | End: 1900-01-01

## 2025-03-28 ENCOUNTER — RX RENEWAL (OUTPATIENT)
Age: 60
End: 2025-03-28

## 2025-04-02 ENCOUNTER — NON-APPOINTMENT (OUTPATIENT)
Age: 60
End: 2025-04-02

## 2025-04-05 NOTE — CHART NOTE - NSCHARTNOTEFT_GEN_A_CORE
Confidential Drug Utilization Report  Search Terms: sneha lozano, 1965Search Date: 04/05/2025 15:31:24 PM  The Drug Utilization Report below displays all of the controlled substance prescriptions, if any, that your patient has filled in the last twelve months. The information displayed on this report is compiled from pharmacy submissions to the Department, and accurately reflects the information as submitted by the pharmacies.    This report was requested by: Alannah Nuno | Reference #: [id number]    You have not added a JACOB number. Keeping your JACOB number(s) up to date on the My JACOB # tab will enable the separation of your prescriptions from others in the search results.    Practitioner Count: 4  Pharmacy Count: 1  Current Opioid Prescriptions: 0  Current Benzodiazepine Prescriptions: 0  Current Stimulant Prescriptions: 0      Patient Demographic Information (PDI)       PDI	First Name	Last Name	Birth Date	Gender	Street Address	City	State	Zip Code  A	Sneha Lozano	1965	Female	[address] [place]	[address]    Prescription Information      PDI Filter:    PDI	Current Rx	Drug Type	Rx Written	Rx Dispensed	Drug	Quantity	Days Supply	Prescriber Name	Prescriber JACOB #	Payment Method	Dispenser  A	Y		12/31/2024	04/01/2025	pregabalin 50 mg capsule	58	30	Araceli Randall	[id number]	[facility] #[id number]  A	Y		03/28/2025	04/01/2025	carisoprodol 350 mg tablet	21	7	Zoila, Ash	[id number]	[facility] #[id number]  A	N	O	03/26/2025	03/26/2025	tramadol hcl 50 mg tablet	9	3	Yeimi Soto	[id number]	[facility] #[id number]  A	N	O	03/04/2025	03/05/2025	tramadol hcl 50 mg tablet	9	3	Zoila, Ash	[id number]	[facility] #[id number]  A	N		02/06/2025	02/06/2025	carisoprodol 350 mg tablet	21	7	Zoila, Ash	[id number]	[facility] #[id number]  A	N	B	01/30/2025	01/30/2025	diazepam 5 mg tablet	9	3	Marva Tim	[id number]	[facility] #[id number]  A	N	O	01/30/2025	01/30/2025	oxycodone hcl (ir) 5 mg tablet	9	3	Marva Tim	[id number]	[facility] #[id number]  A	N		12/31/2024	01/08/2025	pregabalin 50 mg capsule	60	30	Araceli Randall	[id number]	Insurance	[facility] #[id number]  A	N		09/03/2024	09/04/2024	pregabalin 25 mg capsule	60	30	Araceli Randall	[id number]	Insurance	[facility] #[id number]  A	N		07/16/2024	07/16/2024	pregabalin 25 mg capsule	60	30	Araceli Randall	[id number]	Insurance	[facility] #[id number]

## 2025-04-07 RX ORDER — POVIDONE-IODINE 7.5 %
1 SOLUTION, NON-ORAL TOPICAL ONCE
Refills: 0 | Status: COMPLETED | OUTPATIENT
Start: 2025-04-11 | End: 2025-04-11

## 2025-04-11 ENCOUNTER — OUTPATIENT (OUTPATIENT)
Dept: INPATIENT UNIT | Facility: HOSPITAL | Age: 60
LOS: 1 days | End: 2025-04-11
Payer: COMMERCIAL

## 2025-04-11 ENCOUNTER — TRANSCRIPTION ENCOUNTER (OUTPATIENT)
Age: 60
End: 2025-04-11

## 2025-04-11 ENCOUNTER — RESULT REVIEW (OUTPATIENT)
Age: 60
End: 2025-04-11

## 2025-04-11 ENCOUNTER — APPOINTMENT (OUTPATIENT)
Dept: ORTHOPEDIC SURGERY | Facility: HOSPITAL | Age: 60
End: 2025-04-11

## 2025-04-11 VITALS
SYSTOLIC BLOOD PRESSURE: 115 MMHG | DIASTOLIC BLOOD PRESSURE: 74 MMHG | HEIGHT: 62 IN | RESPIRATION RATE: 16 BRPM | TEMPERATURE: 97 F | HEART RATE: 75 BPM | WEIGHT: 123.02 LBS | OXYGEN SATURATION: 100 %

## 2025-04-11 VITALS
SYSTOLIC BLOOD PRESSURE: 114 MMHG | HEART RATE: 90 BPM | TEMPERATURE: 98 F | OXYGEN SATURATION: 97 % | RESPIRATION RATE: 17 BRPM | DIASTOLIC BLOOD PRESSURE: 73 MMHG

## 2025-04-11 DIAGNOSIS — S32.050A WEDGE COMPRESSION FRACTURE OF FIFTH LUMBAR VERTEBRA, INITIAL ENCOUNTER FOR CLOSED FRACTURE: ICD-10-CM

## 2025-04-11 DIAGNOSIS — S32.020A WEDGE COMPRESSION FRACTURE OF SECOND LUMBAR VERTEBRA, INITIAL ENCOUNTER FOR CLOSED FRACTURE: ICD-10-CM

## 2025-04-11 DIAGNOSIS — S32.000A WEDGE COMPRESSION FRACTURE OF UNSPECIFIED LUMBAR VERTEBRA, INITIAL ENCOUNTER FOR CLOSED FRACTURE: ICD-10-CM

## 2025-04-11 DIAGNOSIS — Z96.652 PRESENCE OF LEFT ARTIFICIAL KNEE JOINT: Chronic | ICD-10-CM

## 2025-04-11 DIAGNOSIS — Z98.891 HISTORY OF UTERINE SCAR FROM PREVIOUS SURGERY: Chronic | ICD-10-CM

## 2025-04-11 DIAGNOSIS — Z90.3 ACQUIRED ABSENCE OF STOMACH [PART OF]: Chronic | ICD-10-CM

## 2025-04-11 PROCEDURE — 22514 PERQ VERTEBRAL AUGMENTATION: CPT | Mod: 78

## 2025-04-11 PROCEDURE — 36415 COLL VENOUS BLD VENIPUNCTURE: CPT

## 2025-04-11 PROCEDURE — 22515 PERQ VERTEBRAL AUGMENTATION: CPT

## 2025-04-11 PROCEDURE — C1889: CPT

## 2025-04-11 PROCEDURE — 88307 TISSUE EXAM BY PATHOLOGIST: CPT | Mod: 26

## 2025-04-11 PROCEDURE — 88311 DECALCIFY TISSUE: CPT | Mod: 26

## 2025-04-11 PROCEDURE — 22514 PERQ VERTEBRAL AUGMENTATION: CPT

## 2025-04-11 PROCEDURE — 88307 TISSUE EXAM BY PATHOLOGIST: CPT

## 2025-04-11 PROCEDURE — C1713: CPT

## 2025-04-11 PROCEDURE — 88311 DECALCIFY TISSUE: CPT

## 2025-04-11 PROCEDURE — C1726: CPT

## 2025-04-11 PROCEDURE — C9399: CPT

## 2025-04-11 DEVICE — STRYKER KIT OMNICURVE FRACTURE 11G 20MM: Type: IMPLANTABLE DEVICE | Status: FUNCTIONAL

## 2025-04-11 DEVICE — KIT CEMENT MIX PREP AUTOPLEX M4 W/ VERT HV: Type: IMPLANTABLE DEVICE | Status: FUNCTIONAL

## 2025-04-11 DEVICE — SURGIFLO MATRIX WITH THROMBIN KIT: Type: IMPLANTABLE DEVICE | Status: FUNCTIONAL

## 2025-04-11 DEVICE — KIT CEMENT MIX PREP AUTOPLEX M4: Type: IMPLANTABLE DEVICE | Status: FUNCTIONAL

## 2025-04-11 RX ORDER — CELECOXIB 50 MG/1
200 CAPSULE ORAL ONCE
Refills: 0 | Status: COMPLETED | OUTPATIENT
Start: 2025-04-11 | End: 2025-04-11

## 2025-04-11 RX ORDER — CELECOXIB 50 MG/1
1 CAPSULE ORAL
Qty: 20 | Refills: 0
Start: 2025-04-11 | End: 2025-04-20

## 2025-04-11 RX ORDER — HYDROMORPHONE/SOD CHLOR,ISO/PF 2 MG/10 ML
0.5 SYRINGE (ML) INJECTION
Refills: 0 | Status: DISCONTINUED | OUTPATIENT
Start: 2025-04-11 | End: 2025-04-11

## 2025-04-11 RX ORDER — CEFAZOLIN SODIUM IN 0.9 % NACL 3 G/100 ML
2000 INTRAVENOUS SOLUTION, PIGGYBACK (ML) INTRAVENOUS ONCE
Refills: 0 | Status: DISCONTINUED | OUTPATIENT
Start: 2025-04-11 | End: 2025-04-11

## 2025-04-11 RX ORDER — METHOCARBAMOL 500 MG/1
1 TABLET, FILM COATED ORAL
Qty: 9 | Refills: 0
Start: 2025-04-11 | End: 2025-04-13

## 2025-04-11 RX ORDER — ONDANSETRON HCL/PF 4 MG/2 ML
4 VIAL (ML) INJECTION ONCE
Refills: 0 | Status: DISCONTINUED | OUTPATIENT
Start: 2025-04-11 | End: 2025-04-11

## 2025-04-11 RX ORDER — APREPITANT 40 MG/1
40 CAPSULE ORAL ONCE
Refills: 0 | Status: COMPLETED | OUTPATIENT
Start: 2025-04-11 | End: 2025-04-11

## 2025-04-11 RX ORDER — OXYCODONE HYDROCHLORIDE 30 MG/1
1 TABLET ORAL
Qty: 20 | Refills: 0
Start: 2025-04-11

## 2025-04-11 RX ORDER — ACETAMINOPHEN 500 MG/5ML
975 LIQUID (ML) ORAL ONCE
Refills: 0 | Status: COMPLETED | OUTPATIENT
Start: 2025-04-11 | End: 2025-04-11

## 2025-04-11 RX ORDER — FENTANYL CITRATE-0.9 % NACL/PF 100MCG/2ML
25 SYRINGE (ML) INTRAVENOUS
Refills: 0 | Status: DISCONTINUED | OUTPATIENT
Start: 2025-04-11 | End: 2025-04-11

## 2025-04-11 RX ADMIN — APREPITANT 40 MILLIGRAM(S): 40 CAPSULE ORAL at 12:27

## 2025-04-11 RX ADMIN — Medication 975 MILLIGRAM(S): at 12:27

## 2025-04-11 RX ADMIN — Medication 3 MILLILITER(S): at 12:47

## 2025-04-11 RX ADMIN — CELECOXIB 200 MILLIGRAM(S): 50 CAPSULE ORAL at 12:27

## 2025-04-11 RX ADMIN — Medication 1 APPLICATION(S): at 12:46

## 2025-04-11 NOTE — BRIEF OPERATIVE NOTE - FIRST ASSIST PARTICIPATION DETAILS - (COMPONENTS OF THE PROCEDURE THAT ASSISTANT PARTICIPATED IN)
I acted within this role throughout the entirety of the procedure performed by the primary surgeon Protopic Pregnancy And Lactation Text: This medication is Pregnancy Category C. It is unknown if this medication is excreted in breast milk when applied topically.

## 2025-04-11 NOTE — ASU DISCHARGE PLAN (ADULT/PEDIATRIC) - PAIN MANAGEMENT
Can take Tylenol 1000mg every 8 hours for first week/Prescriptions electronically submitted to pharmacy from [facility]

## 2025-04-11 NOTE — ASU DISCHARGE PLAN (ADULT/PEDIATRIC) - ASU DC SPECIAL INSTRUCTIONSFT
From: Cristin Flores  To: DOMINIQUE Khanna  Sent: 5/17/2021 2:54 PM CDT  Subject: Medication Question    This message is being sent by Fariba Flores on behalf of Cristin Flores    Thank you for your response. I haven’t received an email from the [place] pharmacy that Cristin’s script is ready. Was it sent in?    Fariba Flores  
Please make an appointment for follow up with your surgeon in 1-2 weeks. Call to schedule an appointment. Keep incision site clean and dry. No creams, lotions, or ointments to incision area. You are cleared to shower 3 days after surgery unless otherwise instructed.     Take pain medication as prescribed after surgery for pain control. Contact your surgeon's office if pain increases while taking prescribed pain medications or if related concerns develop. If you are taking narcotic pain medications, take a stool softener with it to prevent narcotic associated constipation. Additionally, increase water intake (drink at least 8 glasses daily) and add fiber to your diet by eating fruits, vegetables and foods that are rich in grains.     NO heavy lifting, strenuous activity, twisting, bending, driving, or working until cleared by your surgeon  Contact your surgeon's office immediately for any of the following: fever, bleeding, new onset numbness/tingling/weakness, nausea and/or vomiting, urinary and/or fecal incontinence or retention. If an emergency occurs (chest pain, shortness of breath, new confusion, seizure, altered mental status, or other), call 911 and go to the emergency department for evaluation.

## 2025-04-11 NOTE — ASU DISCHARGE PLAN (ADULT/PEDIATRIC) - CARE PROVIDER_API CALL
Ash Cummings  Spine Surgery  [address] [place] [address]  Phone: [phone]  Fax: [phone]  Follow Up Time:

## 2025-04-11 NOTE — ASU DISCHARGE PLAN (ADULT/PEDIATRIC) - FINANCIAL ASSISTANCE
[facility] provides services at a reduced cost to those who are determined to be eligible through [facility]’s financial assistance program. Information regarding [facility]’s financial assistance program can be found by going to [email or web address] or by calling [phone].

## 2025-04-14 PROBLEM — M81.0 AGE-RELATED OSTEOPOROSIS WITHOUT CURRENT PATHOLOGICAL FRACTURE: Chronic | Status: ACTIVE | Noted: 2025-04-05

## 2025-04-14 PROBLEM — S32.000A WEDGE COMPRESSION FRACTURE OF UNSPECIFIED LUMBAR VERTEBRA, INITIAL ENCOUNTER FOR CLOSED FRACTURE: Chronic | Status: ACTIVE | Noted: 2025-04-05

## 2025-04-14 PROBLEM — F17.200 NICOTINE DEPENDENCE, UNSPECIFIED, UNCOMPLICATED: Chronic | Status: ACTIVE | Noted: 2025-04-05

## 2025-04-17 LAB — SURGICAL PATHOLOGY STUDY: SIGNIFICANT CHANGE UP

## 2025-04-18 ENCOUNTER — EMERGENCY (EMERGENCY)
Facility: HOSPITAL | Age: 60
LOS: 1 days | End: 2025-04-18
Attending: STUDENT IN AN ORGANIZED HEALTH CARE EDUCATION/TRAINING PROGRAM
Payer: COMMERCIAL

## 2025-04-18 VITALS
DIASTOLIC BLOOD PRESSURE: 99 MMHG | OXYGEN SATURATION: 100 % | WEIGHT: 149.91 LBS | HEIGHT: 62 IN | TEMPERATURE: 98 F | HEART RATE: 109 BPM | SYSTOLIC BLOOD PRESSURE: 128 MMHG | RESPIRATION RATE: 18 BRPM

## 2025-04-18 DIAGNOSIS — Z98.891 HISTORY OF UTERINE SCAR FROM PREVIOUS SURGERY: Chronic | ICD-10-CM

## 2025-04-18 DIAGNOSIS — Z96.652 PRESENCE OF LEFT ARTIFICIAL KNEE JOINT: Chronic | ICD-10-CM

## 2025-04-18 DIAGNOSIS — Z90.3 ACQUIRED ABSENCE OF STOMACH [PART OF]: Chronic | ICD-10-CM

## 2025-04-18 PROCEDURE — 99284 EMERGENCY DEPT VISIT MOD MDM: CPT | Mod: 25

## 2025-04-18 PROCEDURE — 93005 ELECTROCARDIOGRAM TRACING: CPT

## 2025-04-18 PROCEDURE — 99284 EMERGENCY DEPT VISIT MOD MDM: CPT

## 2025-04-18 PROCEDURE — 93010 ELECTROCARDIOGRAM REPORT: CPT

## 2025-04-18 NOTE — ED PROVIDER NOTE - OBJECTIVE STATEMENT
60 F hx of iron deficiency anemia recent spine surgery w/ Dr. Cummings here last week BIB EMS for ?syncope. Pt reports went to bed feeling fine last night and woke up this morning not in her bed. Pt has a new hospital bed w/ a slippery cover and thinks that she slipped onto the floor but is unsure if she was adjacent to the bed when she woke up, she knows she was somewhere near the bed. She is without complaint at this time. She denies back pain, neck pain, headache, chest pain, SOB, N/V/D, black/tarry stools, numbness, tingling, focal weakness, leg swelling, or any other complaints.

## 2025-04-18 NOTE — ED PROVIDER NOTE - REFUSAL OF SERVICE, MDM
Pt understands and recalls risks of leaving against medical advice, including death from anemia, PE, cardiac arrest. Pt states that pt will see PMD. Pt advised to return to the ED if pt feels worse.

## 2025-04-18 NOTE — ED PROVIDER NOTE - PATIENT PORTAL LINK FT
You can access the FollowMyHealth Patient Portal offered by [facility] by registering at the following website: [email or web address]. By joining [facility]’s FollowMyHealth portal, you will also be able to view your health information using other applications (apps) compatible with our system.

## 2025-04-18 NOTE — ED ADULT NURSE NOTE - CHIEF COMPLAINT QUOTE
as per EMS pt had syncopal episode, pt found on the floor by . denies blood thinners. reports having back surgery 1 week ago at [facility]. denies chest pain or sob.

## 2025-04-18 NOTE — ED PROVIDER NOTE - CLINICAL SUMMARY MEDICAL DECISION MAKING FREE TEXT BOX
60 F hx of iron deficiency anemia recent spine surgery w/ Dr. Cummings here last week BIB EMS for ?syncope. Pt reports went to bed feeling fine last night and woke up this morning not in her bed. Pt has a new hospital bed w/ a slippery cover and thinks that she slipped onto the floor but is unsure if she was adjacent to the bed when she woke up, she knows she was somewhere near the bed. She is without complaint at this time. 60 F hx of iron deficiency anemia recent spine surgery w/ Dr. Cummings here last week BIB EMS for ?syncope. Pt reports went to bed feeling fine last night and woke up this morning not in her bed. Pt has a new hospital bed w/ a slippery cover and thinks that she slipped onto the floor but is unsure if she was adjacent to the bed when she woke up, she knows she was somewhere near the bed. She is without complaint at this time. labs ordered. Pt electing to leave before labs. Has capacity to make this decision. Pt understands and recalls risks of leaving against medical advice, including death from anemia, PE, cardiac arrest. Pt states that pt will see PMD. Pt advised to return to the ED if pt feels worse.

## 2025-04-18 NOTE — ED PROVIDER NOTE - ATTENDING CONTRIBUTION TO CARE
I have personally performed a history and physical examination of the patient and discussed management with the resident as well as the patient.  I reviewed the resident's note and agree with the documented findings and plan of care.  I have authored and modified critical sections of the Provider Note, including but not limited to HPI, Physical Exam and MDM.    60 F hx of iron deficiency anemia recent spine surgery w/ Dr. Cummings here last week BIB EMS for ?syncope. Pt reports went to bed feeling fine last night and woke up this morning not in her bed. Pt has a new hospital bed w/ a slippery cover and thinks that she slipped onto the floor but is unsure if she was adjacent to the bed when she woke up, she knows she was somewhere near the bed.  Patient tachycardic and pale on arrival.  Consider symptomatic anemia versus PE in the setting of recent spinal surgery.  Will obtain CBC, CMP, D-dimer, TNS.  Patient otherwise hemodynamically stable at this time.  Patient after prolonged discussion refusing to wait for lab results and requesting to leave.  Discussed high risk of death from severe anemia or undiagnosed PE.  Patient verbalized agreement understanding and still requesting to leave AGAINST MEDICAL ADVICE at this time.  This patient is requesting to sign out of the hospital against medical advice.  I have welcomed the patient to stay in the hospital.  The patient was informed that a diagnosis has not been made and that further work up is necessary.  The patient was informed in clear language or by  that leaving the hospital may result in poor medical outcome and/or death.  The patient was informed that I wish that the patient would stay in the hospital to complete their medical work up.  The patient does not have altered mental status and is of sound mind and not suicidal or intoxicated in any way.  Pt shows clear thinking. The patient verbalized understanding of the above and verbalized to me that they understand that leaving the hospital may result in poor outcome, worsening of medical condition, permanent damage and death.  Despite this the patient insisted on signing out AMA. Pt welcomed to return to the hospital at anytime for further evaluation.  Dr Zayas witnessed this interaction with the patient.

## 2025-04-18 NOTE — ED ADULT TRIAGE NOTE - CHIEF COMPLAINT QUOTE
as per EMS pt had syncopal episode, pt found on the floor by . denies blood thinners. reports having back surgery 1 week ago at [facility]. denies chest pain or sob.

## 2025-04-18 NOTE — ED PROVIDER NOTE - NSFOLLOWUPINSTRUCTIONS_ED_ALL_ED_FT
You are electing to leave against medical advice. The risks of leaving against medical advice include death from anemia, PE, cardiac arrest.    -Please follow-up with your primary care doctor in the next 2 days.  Please call tomorrow for an appointment.  If you cannot follow-up with your primary care doctor please return to the ED for any urgent issues.  - You were given a copy of the tests performed today.  Please bring the results with you and review them with your primary care doctor.  - If you have any worsening of symptoms or any other concerns please return to the ED immediately.  - Please continue taking your home medications as directed.

## 2025-04-18 NOTE — ED PROVIDER NOTE - PHYSICAL EXAMINATION
Gen: NAD  Head: NC/AT  ENT: pale conjunctiva  Neck: trachea midline  Card: mild tachycardia  Resp:  CTAB  Abd: soft, non-distended, non-tender  Ext: no deformities  Neuro: A&Ox3, HURLEY spontaneously, sensation intact and symmetrical b/l extremities, no facial droop, no slurred speech, EOMI  Skin:  Warm and dry as visualized  Psych:  Normal affect and mood

## 2025-04-21 ENCOUNTER — NON-APPOINTMENT (OUTPATIENT)
Age: 60
End: 2025-04-21

## 2025-04-22 ENCOUNTER — APPOINTMENT (OUTPATIENT)
Dept: ORTHOPEDIC SURGERY | Facility: CLINIC | Age: 60
End: 2025-04-22

## 2025-04-22 NOTE — ED ADULT TRIAGE NOTE - MODE OF ARRIVAL
Rx Refill Note  Requested Prescriptions     Pending Prescriptions Disp Refills    ranolazine (RANEXA) 1000 MG 12 hr tablet 180 tablet 1     Sig: Take 1 tablet by mouth Every 12 (Twelve) Hours.    Rx Refill Note  Requested Prescriptions     Pending Prescriptions Disp Refills    ranolazine (RANEXA) 1000 MG 12 hr tablet 180 tablet 1     Sig: Take 1 tablet by mouth Every 12 (Twelve) Hours.      Last office visit with office: 3/12/24  Next office visit with office:     UDS: n/a    DATE OF LAST REFILL: 3/18/24    Controlled Substance Agreement: {TCC Controlled Med Contract Status:[id number]}    N/a    Daphne Heard MA  06/14/24, 12:50 CDT   Last office visit with prescribing clinician: 1/22/2024   Last telemedicine visit with prescribing clinician: Visit date not found   Next office visit with prescribing clinician: 11/20/2024   {    Daphne Heard MA  06/14/24, 12:48 CDT     [facility]ulance EMS

## 2025-04-22 NOTE — ED ADULT NURSE NOTE - OBJECTIVE STATEMENT
pt BIBEMS s/p witnessed cardiac arrest, pt intubated with a 7 tube, 24 at the lip, pt unresponsive/cyanotic on arrival, BGL 12 on arrival, code blue called @ 1924, see code blue flowsheets.

## 2025-04-22 NOTE — ED PROVIDER NOTE - OBJECTIVE STATEMENT
59 yo F pmh chronic back pain, recent back surgery 10 days ago, anemia (9.3, no concerns for GIB per chart review) presents with witnessed cardiac arrest. Patient was with her friend when she was ashen grey, friend states she went upstairs to get water and when she came down (3 min later), pupils were dilated, patient was agonally breathing and had thready pulse. friend started CPR. When EMS arrived, patient was in asystole/PEA, s/p 2 epi, also hypoglycemic to 30s, rpt FS 50s s/p 2 bags of D10. ACLS 59 yo F pmh chronic back pain, recent back surgery 10 days ago, anemia (9.3, no concerns for GIB per chart review) presents with witnessed cardiac arrest. Patient was with her friend when she was ashen grey, friend states she went upstairs to get water and when she came down (3 min later), pupils were dilated, patient was agonally breathing and had thready pulse. friend started CPR., patient was in asystole/PEA with EMS, s/p 2 epi, also hypoglycemic to 30s, rpt FS 50s s/p 2 bags of D10.ET tube placed in the field confirmed with bilateral breath sounds, color change in the tube and direct laryngoscopy showing tube in the vocal cords. patient lost pulses while in ED, at 1924. ACLS Protocol followed.  Patient was in PEA arrest..  POCUS showed bilateral lung sliding very enlarged right atrium and right ventricle, concerning for possible PE in the setting of recent surgery.  tPA administered after Li and right femoral line placed. Transient ROSC then patient transitioned to asystole, started on epi gtt. Also hypoglycemic persistently, status post multiple additional doses of dextrose.  Patient was also hypothermic to 32.8, transient bear hugger placed then stopped given temp >32 with persistent asystole, patient went to Tmax 33..  Given recent surgery, hypothermia hypoglycemia, considered hypothyroid versus adrenal crisis, Solu-Cortef was given.  Bicarb/calcium administered as well. Patient was persistently in asystole despite temperature over 32. Patient then transitioned to fine V-fib, received 1 shock.  At this time, family arrived at bedside, spoke to  Gordon, stated that patient would not want to live if quality of life was compromised. At approx 1 hour of CPR, Time of death called 2020, pupils fixed and dilated absent breath sounds no cardiac activity 61 yo F pmh chronic back pain, recent back surgery 10 days ago, anemia (9.3, no concerns for GIB per chart review) presents with witnessed cardiac arrest. Patient was with her friend when she was ashen grey, friend states she went upstairs to get water and when she came down (3 min later), pupils were dilated, patient was agonally breathing and had thready pulse. friend started CPR. friend was concerned patient possibly took too much of her pain medication narcotic that she was prescribed for surgery but patient stated she only took 2, patient was in asystole/PEA with EMS, s/p 2 epi, also hypoglycemic to 30s, rpt FS 50s s/p 2 bags of D10.ET tube placed in the field confirmed with bilateral breath sounds, color change in the tube and direct laryngoscopy showing tube in the vocal cords. patient lost pulses while in ED, at 1924. ACLS Protocol followed.  Patient was in PEA arrest..  POCUS showed bilateral lung sliding very enlarged right atrium and right ventricle, concerning for possible PE in the setting of recent surgery.  tPA administered after Li and right femoral line placed. Transient ROSC then patient transitioned to asystole, started on epi gtt. Also hypoglycemic persistently, status post multiple additional doses of dextrose.  Patient was also hypothermic to 32.8, transient bear hugger placed then stopped given temp >32 with persistent asystole, patient went to Tmax 33..  Given recent surgery, hypothermia hypoglycemia, considered hypothyroid versus adrenal crisis, Solu-Cortef was given.  Bicarb/calcium administered as well. Patient was persistently in asystole despite temperature over 32. Patient then transitioned to fine V-fib, received 1 shock.  At this time, family arrived at bedside, spoke to  Gordon, stated that patient would not want to live if quality of life was compromised. At approx 1 hour of CPR, Time of death called 2020, pupils fixed and dilated absent breath sounds no cardiac activity 59 yo F pmh chronic back pain, recent back surgery 10 days ago, anemia (9.3, no concerns for GIB per chart review) presents with witnessed cardiac arrest. Patient was with her friend when she was ashen grey, friend states she went upstairs to get water and when she came down (3 min later), pupils were dilated, patient was agonally breathing and had thready pulse. friend started CPR. friend was concerned patient possibly took too much of her pain medication narcotic that she was prescribed for surgery but patient stated she only took 2, patient was in asystole/PEA with EMS, s/p 2 epi, also hypoglycemic to 30s, rpt FS 50s s/p 2 bags of D10.ET tube placed in the field confirmed with bilateral breath sounds, color change in the tube and direct laryngoscopy showing tube in the vocal cords. patient lost pulses while in ED, at 1924. ACLS Protocol followed.  Patient was in PEA arrest..  POCUS showed bilateral lung sliding very enlarged right atrium and right ventricle, concerning for possible PE in the setting of recent surgery.  tPA administered after Li and right femoral line placed. Transient ROSC then patient transitioned to asystole, started on epi gtt. Also hypoglycemic persistently, status post multiple additional doses of dextrose.  Patient was also hypothermic to 32.8, transient bear hugger placed then stopped given temp >32 with persistent asystole, patient went to Tmax 33..  Given recent surgery, hypothermia hypoglycemia, considered hypothyroid versus adrenal crisis, Solu-Cortef was given.  Bicarb/calcium administered as well as narcan. Patient was persistently in asystole despite temperature over 32. Patient then transitioned to fine V-fib, received 1 shock.  At this time, family arrived at bedside, spoke to  Gordon, stated that patient would not want to live if quality of life was compromised. At approx 1 hour of CPR, Time of death called 2020, pupils fixed and dilated absent breath sounds no cardiac activity

## 2025-04-22 NOTE — ED PROVIDER NOTE - MEDICAL EXAMINER NAME
Roomed by: Azra VILLALTA  Pt is presented in room with valentino Granados.     LCV: 05/09/2019    HPI: 15 year old female presents for follow up acne on forehead. Treated with clindamycin gel. Pt state gel has helped.     No other symptoms, aggravating factors, or treatments noted.  ROS: besides positives in HPI all other systems negative.  The allergy and medication lists were reviewed in the electronic medical record today. Patient has no history on file for alcohol. Patient reports that she has never smoked. She has never used smokeless tobacco. Patient's family history is not on file.    PE: General: alert, WDWN, no respiratory distress, normal affect  Eyes: no injection  Focused skin exam significant for: few comedones located on the forehead and nose  Face(including eyelids and lips)/Neck/Hands: no lesions that appear malignant    A/P:  1. Acne  - better but still active  - rx given for tretinoin  - pt to cont rx Benzaclin as well    RTC: prn    Electronically Signed by: Uche Head MD, 9/12/2019   John Chang

## 2025-04-22 NOTE — ED ADULT NURSE NOTE - NSFALLHARMRISKINTERV_ED_ALL_ED
Assistance OOB with selected safe patient handling equipment if applicable/Communicate risk of Fall with Harm to all staff, patient, and family/Provide visual cue: red socks, yellow wristband, yellow gown, etc/Reinforce activity limits and safety measures with patient and family/Bed in lowest position, wheels locked, appropriate side rails in place/Call bell, personal items and telephone in reach/Instruct patient to call for assistance before getting out of bed/chair/stretcher/Non-slip footwear applied when patient is off stretcher/[place] to call system/Physically safe environment - no spills, clutter or unnecessary equipment/Purposeful Proactive Rounding/Room/bathroom lighting operational, light cord in reach

## 2025-04-22 NOTE — ED PROVIDER NOTE - PHYSICAL EXAMINATION
PHYSICAL EXAM:   General: pupils fixed and dilated  HEENT: ET tube in place  Abdominal: soft, nontender, nondistended, no rebound, guarding or rigidity  Extremities: no LE edema b/l.   Neuro: Alert and oriented x0  Skin:mottled extremities, cyanoitc nose/face, no rashes noted  -Corie Bah MD Attending Physician

## 2025-04-22 NOTE — ED POST DISCHARGE NOTE - OTHER
Patient in the ED today as Miguelito Critical and is currently cardiac arrest. labs reviewed to assist with care

## 2025-05-20 NOTE — ED PROVIDER NOTE - CONDITION AT DISCHARGE:
Individual Follow-Up Form    5/20/2025    Quit Date: TBD    Clinical Status of Patient: Outpatient    Length of Service: 15 minutes    Continuing Medication: yes  Chantix    Other Medications: None      Target Symptoms: Withdrawal and medication side effects. The following were  rated moderate (3) to severe (4) on TCRS:  Moderate (3): Crave and Desire   Severe (4): None     Comments: Counselor spoke with patient telephonically for follow-up visit, name and date of birth verified as two patient identifiers.   Patient reported he remains smoking 1 pack of CPD, and he began using 1 mg nicotine Varenicline without any negative side effects reported at this time.  Counselor congratulated patient on his progress thus far.  Counselor congratulated patient on his progress thus far.  Counselor also discussed patient continuing to wait prior to smoking, working towards further behavior modification strategies, and attempting further rate reduction.  Follow-up visit scheduled in two weeks.  Counselor will remain available should any further needs arise.    Diagnosis: F17.200    Next Visit: 2 weeks  
Improved

## 2025-05-30 NOTE — REVIEW OF SYSTEMS
Addended by: ANGELIQUE JOHNSON on: 5/30/2025 01:41 PM     Modules accepted: Orders     [Joint Pain] : joint pain [Joint Stiffness] : joint stiffness [Joint Swelling] : joint swelling [Lower Ext Edema] : lower extremity edema [Abdominal Pain] : abdominal pain [Anxiety] : anxiety [Depression] : depression [Sleep Disturbances] : ~T sleep disturbances [Feeling Weak] : feeling weak [Muscle Weakness] : muscle weakness [Easy Bruising] : a tendency for easy bruising [Negative] : Neurological [FreeTextEntry9] : left knee

## (undated) DEVICE — SOL IRR POUR NS 0.9% 1000ML

## (undated) DEVICE — GLV 6 PROTEXIS (BLUE)

## (undated) DEVICE — ELCTR PENCIL NEPTUNE SMOKE EVACUATION

## (undated) DEVICE — ELCTR GROUNDING PAD ADULT COVIDIEN

## (undated) DEVICE — KT PULSAVAC WOUND W/ SPRAYTIP

## (undated) DEVICE — SUT DERMABOND PRINEO 22CM

## (undated) DEVICE — MARKING PEN W RULER

## (undated) DEVICE — DRAPE TOWEL BLUE 17" X 24"

## (undated) DEVICE — DRAPE 3/4 SHEET 52X76"

## (undated) DEVICE — DRAPE LARGE SHEET 72X85"

## (undated) DEVICE — GLV 8 PROTEXIS

## (undated) DEVICE — VENODYNE/SCD SLEEVE CALF MEDIUM

## (undated) DEVICE — WRAP COMPRESSION CALF MED

## (undated) DEVICE — DRAPE C ARM UNIVERSAL

## (undated) DEVICE — GLV 8 PROTEXIS (BLUE)

## (undated) DEVICE — FRAZIER CONNECTING TUBE 2FT 5MM

## (undated) DEVICE — NDL HYPO SAFE 20G X 1.5"

## (undated) DEVICE — DRAPE U LONG 47X70"

## (undated) DEVICE — SUT MONOCRYL 3-0 27" PS-2 UNDYED

## (undated) DEVICE — ZIMMER BLUE CURETTE

## (undated) DEVICE — BLANKET WARMER UPPER ADULT

## (undated) DEVICE — DRAPE 1/2 SHEET 40X57"

## (undated) DEVICE — DRSG STERISTRIPS 0.5 X 4"

## (undated) DEVICE — GLV 6.5 PROTEXIS (WHITE)

## (undated) DEVICE — PACK MINOR WITH LAP

## (undated) DEVICE — SYR CONTROL LUER LOK 10CC

## (undated) DEVICE — PACK TOTAL KNEE

## (undated) DEVICE — BLADE ZIMMER PATELLA REAMER SZ 29

## (undated) DEVICE — SOL IRR POUR H2O 1000ML

## (undated) DEVICE — SYR LUER LOK 50CC

## (undated) DEVICE — DRAPE SPLIT SHEET 77" X 108"

## (undated) DEVICE — DRAPE SHEET XL 77X98"

## (undated) DEVICE — TAPE SILK 3"

## (undated) DEVICE — ZIMMER MIXING BOWL

## (undated) DEVICE — WARMING BLANKET UPPER ADULT

## (undated) DEVICE — STRYKER IVAS BONE BIOPSY KIT 11G

## (undated) DEVICE — GLV 8 PROTEXIS (WHITE)

## (undated) DEVICE — BLADE ZIMMER PATELLA REAMER W PILOT HOLE SZ 32

## (undated) DEVICE — BLADE SAGITTAL DUAL CUT 75X18X1.27MM

## (undated) DEVICE — DEVICE ORTHALIGN PLUS

## (undated) DEVICE — DRAPE TOWEL 1000 SMALL 17" X 11"

## (undated) DEVICE — NDL AVAFLEX PLUS AFH0011 - 11G

## (undated) DEVICE — DRSG MEPILEX 10 X 10CM (4 X 4") AG

## (undated) DEVICE — POSITIONER JACKSON TABLE PRONEVIEW CUSHION, CHEST, HIP, THIGH PADS

## (undated) DEVICE — HOOD FLYTE STRYKER SURGICOOL W PEELAWAY

## (undated) DEVICE — SUT VICRYL 2-0 27" CT-2 UNDYED

## (undated) DEVICE — SUT VICRYL 0 18" CT-1 UNDYED

## (undated) DEVICE — DRAPE HALF SHEET 40X57"

## (undated) DEVICE — SAW BLADE ZIMMER RECIPROCATING DOUBLE SIDED 12.5X96X1.19MM

## (undated) DEVICE — SOL BETADINE POUCH 0.75OZ STERILE

## (undated) DEVICE — DRSG DERMABOND 0.7ML